# Patient Record
Sex: FEMALE | Race: WHITE | NOT HISPANIC OR LATINO | Employment: OTHER | URBAN - METROPOLITAN AREA
[De-identification: names, ages, dates, MRNs, and addresses within clinical notes are randomized per-mention and may not be internally consistent; named-entity substitution may affect disease eponyms.]

---

## 2017-01-04 ENCOUNTER — OFFICE VISIT (OUTPATIENT)
Dept: MEDICAL GROUP | Facility: MEDICAL CENTER | Age: 77
End: 2017-01-04
Payer: MEDICARE

## 2017-01-04 VITALS
SYSTOLIC BLOOD PRESSURE: 150 MMHG | DIASTOLIC BLOOD PRESSURE: 86 MMHG | WEIGHT: 164.2 LBS | HEIGHT: 66 IN | TEMPERATURE: 98.1 F | OXYGEN SATURATION: 95 % | HEART RATE: 63 BPM | BODY MASS INDEX: 26.39 KG/M2 | RESPIRATION RATE: 14 BRPM

## 2017-01-04 DIAGNOSIS — F43.23 SITUATIONAL MIXED ANXIETY AND DEPRESSIVE DISORDER: ICD-10-CM

## 2017-01-04 DIAGNOSIS — I10 ESSENTIAL HYPERTENSION: ICD-10-CM

## 2017-01-04 DIAGNOSIS — F51.01 PRIMARY INSOMNIA: ICD-10-CM

## 2017-01-04 DIAGNOSIS — R53.83 CAREGIVER WITH FATIGUE: ICD-10-CM

## 2017-01-04 PROCEDURE — 99214 OFFICE O/P EST MOD 30 MIN: CPT | Performed by: INTERNAL MEDICINE

## 2017-01-04 RX ORDER — SERTRALINE HYDROCHLORIDE 100 MG/1
100 TABLET, FILM COATED ORAL DAILY
Qty: 90 TAB | Refills: 1 | Status: SHIPPED | OUTPATIENT
Start: 2017-01-04 | End: 2017-03-06

## 2017-01-04 NOTE — PROGRESS NOTES
Janell is a 76 y.o. female who is here today because    Chief Complaint   Patient presents with   • Follow-Up     requesting an increase in dose for zoloft       The patient's current problem list and medication list is:    Patient Active Problem List    Diagnosis Date Noted   • Caregiver with fatigue 09/07/2016   • Other chest pain 03/10/2016   • Situational mixed anxiety and depressive disorder 12/04/2015   • MEDICAL HOME 05/22/2015   • Seasonal asthma 04/27/2015   • Osteoporosis 01/13/2015   • Prediabetes 10/13/2014   • Hyperlipemia 07/25/2014   • Insomnia 07/25/2014   • Back pain 07/25/2014   • Arthritis 07/25/2014   • Hypothyroid 01/31/2013   • Essential hypertension 01/31/2013   • History of colonic diverticulitis 01/30/2013   • Pancreatic cyst 10/22/2012       Current Outpatient Prescriptions   Medication Sig Dispense Refill   • sertraline (ZOLOFT) 100 MG Tab Take 1 Tab by mouth every day. 90 Tab 1   • atorvastatin (LIPITOR) 10 MG Tab Take 1 Tab by mouth every day. 90 Tab 0   • benazepril (LOTENSIN) 40 MG tablet Take 1 Tab by mouth every day. 90 Tab 0   • alprazolam (XANAX) 0.5 MG Tab Take 1 Tab by mouth at bedtime as needed for Sleep. 90 Tab 1   • zolpidem (AMBIEN) 10 MG Tab Take 1 Tab by mouth at bedtime as needed for Sleep. 90 Tab 3   • calcium carbonate (CALCIUM CARBONATE) 500 MG Tab Take 500 mg by mouth 2 times a day, with meals.     • magnesium chloride SR (SLO-MAG) 535 (64 MG) MG Tab CR Take 535 mg by mouth every day.     • furosemide (LASIX) 20 MG Tab TAKE 1 TAB BY MOUTH EVERY DAY. 90 Tab 2   • SYNTHROID 88 MCG Tab TAKE 1 TAB BY MOUTH EVERY DAY. BRAND NAME ONLY INDICATIONS: UNDERACTIVE THYROID 90 Tab 3   • IPRATROPIUM BROMIDE NA Spray  in nose.     • polyethylene glycol/lytes (MIRALAX) PACK Take 17 g by mouth every day.     • therapeutic multivitamin-minerals (THERAGRAN-M) TABS Take 1 Tab by mouth every day.     • Cholecalciferol (VITAMIN D) 2000 UNITS CAPS Take 1 Cap by mouth every day.     •  "conjugated estrogen (PREMARIN) 0.625 MG/GM CREA Insert 0.5 g in vagina every day.     • ipratropium (ATROVENT) 0.02 % SOLN 0.2 mg by Nebulization route 4 times a day.     • Probiotic Product (PROBIOTIC FORMULA PO) Take  by mouth every day.       • cetirizine (ZYRTEC) 10 MG TABS Take 10 mg by mouth every day.     • Azelastine HCl (OPTIVAR OP) by Ophthalmic route every day.     • cefdinir (OMNICEF) 300 MG Cap Take 1 Cap by mouth 2 times a day. 20 Cap 0   • azithromycin (ZITHROMAX) 250 MG Tab Take two tabs on day one followed by one tab on days 2-5. 6 Tab 0     No current facility-administered medications for this visit.     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 Diagnoses of Caregiver with fatigue, Situational mixed anxiety and depressive disorder, Primary insomnia, and Essential hypertension were pertinent to this visit.    1. Caregiver with fatigue  Spending almost 100% of her time caring for Edge.     2. Situational mixed anxiety and depressive disorder  Has noted little change since starting sertraline.     3. Primary insomnia  Sleep has not deteriorated; she is continuing the same medications.    4. Essential hypertension  Recent BP elevated noted at home. No dietary changes. Benazepril is her long time medication. No headache complaint. No dizziness.       ROS Denies chest pain, shortness of breath, changes in bowel and bladder function, lower extremity edema.    Allergies as of 01/04/2017 - Rex as Reviewed 01/04/2017   Allergen Reaction Noted   • Pcn [penicillins] Hives 12/12/2008      /86 mmHg  Pulse 63  Temp(Src) 36.7 °C (98.1 °F)  Resp 14  Ht 1.664 m (5' 5.51\")  Wt 74.481 kg (164 lb 3.2 oz)  BMI 26.90 kg/m2  SpO2 95%    PHYSICAL EXAMINATION  Gen:         Alert and oriented, No apparent distress. Anxious appearance.   Neck:       No Jugular venous distension, Lymphadenopathy, Thyromegalyits.  Lungs:     Clear to auscultation bilaterally  CV:          Regular rate and " rhythm. No murmurs, rubs or gallops.  Abd:         Soft, non distended. No tenderness. Normal active bowel sounds. No   Hepatosplenomegaly, No pulsatile masses.                   Ext:          No clubbing, cyanosis, edema.  Spine:      ROM    ASSESSMENT AND PLAN     76 y.o. female     1. Caregiver with fatigue  Encouraged her and Edge to let friends visit and allow her to leave the house    2. Situational mixed anxiety and depressive disorder  Increase sertraline from 50 to 100  - sertraline (ZOLOFT) 100 MG Tab; Take 1 Tab by mouth every day.  Dispense: 90 Tab; Refill: 1    3. Primary insomnia  Continue with current medication    4. Essential hypertension  Systolic elevation today. Probably anxiety. Will continue to measure at home and let us know if systolic remains above 150. Return to office for BP check next week, and bring her measuring device for accuracy check.      Followup: 1 month

## 2017-01-04 NOTE — MR AVS SNAPSHOT
"        Janell Crow Alvina   2017 11:40 AM   Office Visit   MRN: [id number]    Department:  [address] [place] [address]   Dept Phone:  [phone]    Description:  Female : 1940   Provider:  Roger Gracia M.D.           Reason for Visit     Follow-Up requesting an increase in dose for zoloft      Allergies as of 2017     Allergen Noted Reactions    Pcn [Penicillins] 2008   Hives      You were diagnosed with     Caregiver with fatigue   [[id number]]       Situational mixed anxiety and depressive disorder   [832354]         Vital Signs     Blood Pressure Pulse Temperature Respirations Height Weight    150/86 mmHg 63 36.7 °C (98.1 °F) 14 1.664 m (5' 5.51\") 74.481 kg (164 lb 3.2 oz)    Body Mass Index Oxygen Saturation Smoking Status             26.90 kg/m2 95% Never Smoker          Basic Information     Date Of Birth Sex Race Ethnicity Preferred Language    1940 Female White Non- English      Problem List              ICD-10-CM Priority Class Noted - Resolved    Pancreatic cyst K86.2   10/22/2012 - Present    History of colonic diverticulitis Z87.19   2013 - Present    Hypothyroid E03.9   2013 - Present    Essential hypertension I10   2013 - Present    Hyperlipemia E78.5   2014 - Present    Insomnia G47.00   2014 - Present    Back pain M54.9   2014 - Present    Arthritis M19.90   2014 - Present    Prediabetes R73.03   10/13/2014 - Present    Osteoporosis M81.0   2015 - Present    Seasonal asthma J45.998   2015 - Present    MEDICAL HOME    2015 - Present    Situational mixed anxiety and depressive disorder F43.23   2015 - Present    Other chest pain R07.89   3/10/2016 - Present    Caregiver with fatigue R53.83   2016 - Present      Health Maintenance        Date Due Completion Dates    IMM DTaP/Tdap/Td Vaccine (1 - Tdap) 1959 ---    COLONOSCOPY 2020 (Done)    Override on 2010: Done (DR [facility]an-every 5 years)   " BONE DENSITY 5/8/2020 5/8/2015, 4/27/2011 (Done)    Override on 4/27/2011: Done (Life screening test)            Current Immunizations     13-VALENT PCV PREVNAR 10/31/2015    Influenza Vaccine Adult HD 10/11/2016, 10/31/2015    Pneumococcal polysaccharide vaccine (PPSV-23) 11/3/2006    SHINGLES VACCINE 10/30/2013      Below and/or attached are the medications your provider expects you to take. Review all of your home medications and newly ordered medications with your provider and/or pharmacist. Follow medication instructions as directed by your provider and/or pharmacist. Please keep your medication list with you and share with your provider. Update the information when medications are discontinued, doses are changed, or new medications (including over-the-counter products) are added; and carry medication information at all times in the event of emergency situations     Allergies:  PCN - Hives               Medications  Valid as of: January 04, 2017 - 12:42 PM    Generic Name Brand Name Tablet Size Instructions for use    ALPRAZolam (Tab) XANAX 0.5 MG Take 1 Tab by mouth at bedtime as needed for Sleep.        Atorvastatin Calcium (Tab) LIPITOR 10 MG Take 1 Tab by mouth every day.        Azelastine HCl   by Ophthalmic route every day.        Azithromycin (Tab) ZITHROMAX 250 MG Take two tabs on day one followed by one tab on days 2-5.        Benazepril HCl (Tab) LOTENSIN 40 MG Take 1 Tab by mouth every day.        Cefdinir (Cap) OMNICEF 300 MG Take 1 Cap by mouth 2 times a day.        Cetirizine HCl (Tab) ZYRTEC 10 MG Take 10 mg by mouth every day.        Cholecalciferol (Cap) Vitamin D 2000 UNITS Take 1 Cap by mouth every day.        Estrogens, Conjugated (Cream) PREMARIN 0.625 MG/GM Insert 0.5 g in vagina every day.        Furosemide (Tab) LASIX 20 MG TAKE 1 TAB BY MOUTH EVERY DAY.        Ipratropium Bromide (Solution) ATROVENT 0.02 % 0.2 mg by Nebulization route 4 times a day.        Ipratropium Bromide   Spray   in nose.        Levothyroxine Sodium (Tab) SYNTHROID 88 MCG TAKE 1 TAB BY MOUTH EVERY DAY. BRAND NAME ONLY INDICATIONS: UNDERACTIVE THYROID        Magnesium Chloride (Tab CR) SLO- (64 MG) MG Take 535 mg by mouth every day.        Multiple Vitamins-Minerals (Tab) THERAGRAN-M  Take 1 Tab by mouth every day.        Oyster Shell (Tab) OS-CUBA 500 500 MG Take 500 mg by mouth 2 times a day, with meals.        Polyethylene Glycol 3350 (Pack) MIRALAX  Take 17 g by mouth every day.        Probiotic Product   Take  by mouth every day.          Sertraline HCl (Tab) ZOLOFT 100 MG Take 1 Tab by mouth every day.        Zolpidem Tartrate (Tab) AMBIEN 10 MG Take 1 Tab by mouth at bedtime as needed for Sleep.        .                 Medicines prescribed today were sent to:     [facility]/PHARMACY #[id number] - [place], [place] - [address]    [address] [place] [address]    Phone: [phone] Fax: [phone]    Open 24 Hours?: No      Medication refill instructions:       If your prescription bottle indicates you have medication refills left, it is not necessary to call your provider’s office. Please contact your pharmacy and they will refill your medication.    If your prescription bottle indicates you do not have any refills left, you may request refills at any time through one of the following ways: The online [employer] system (except Urgent Care), by calling your provider’s office, or by asking your pharmacy to contact your provider’s office with a refill request. Medication refills are processed only during regular business hours and may not be available until the next business day. Your provider may request additional information or to have a follow-up visit with you prior to refilling your medication.   *Please Note: Medication refills are assigned a new Rx number when refilled electronically. Your pharmacy may indicate that no refills were authorized even though a new prescription for the same medication is available at  the pharmacy. Please request the medicine by name with the pharmacy before contacting your provider for a refill.        Other Notes About Your Plan     Patient is enrolled in [facility] with Dr. Gracia.           [facility] Access Code: Activation code not generated  Current [employer] Status: Active

## 2017-01-06 ENCOUNTER — NON-PROVIDER VISIT (OUTPATIENT)
Dept: MEDICAL GROUP | Facility: MEDICAL CENTER | Age: 77
End: 2017-01-06
Payer: MEDICARE

## 2017-01-06 VITALS — SYSTOLIC BLOOD PRESSURE: 149 MMHG | DIASTOLIC BLOOD PRESSURE: 59 MMHG

## 2017-01-06 DIAGNOSIS — I10 ESSENTIAL HYPERTENSION: ICD-10-CM

## 2017-01-06 NOTE — MR AVS SNAPSHOT
Janell Crow Alvina   2017 1:30 PM   Non-Provider Visit   MRN: [id number]    Department:  [address] [place] [address]   Dept Phone:  [phone]    Description:  Female : 1940   Provider:  MEDICAL ASSISTANT           Allergies as of 2017     Allergen Noted Reactions    Pcn [Penicillins] 2008   Hives      Vital Signs     Blood Pressure Smoking Status                149/59 mmHg Never Smoker           Basic Information     Date Of Birth Sex Race Ethnicity Preferred Language    1940 Female White Non- English      Problem List              ICD-10-CM Priority Class Noted - Resolved    Pancreatic cyst K86.2   10/22/2012 - Present    History of colonic diverticulitis Z87.19   2013 - Present    Hypothyroid E03.9   2013 - Present    Essential hypertension I10   2013 - Present    Hyperlipemia E78.5   2014 - Present    Insomnia G47.00   2014 - Present    Back pain M54.9   2014 - Present    Arthritis M19.90   2014 - Present    Prediabetes R73.03   10/13/2014 - Present    Osteoporosis M81.0   2015 - Present    Seasonal asthma J45.998   2015 - Present    MEDICAL HOME    2015 - Present    Situational mixed anxiety and depressive disorder F43.23   2015 - Present    Other chest pain R07.89   3/10/2016 - Present    Caregiver with fatigue R53.83   2016 - Present      Health Maintenance        Date Due Completion Dates    IMM DTaP/Tdap/Td Vaccine (1 - Tdap) 1959 ---    COLONOSCOPY 2020 (Done)    Override on 2010: Done (DR Duran-every 5 years)    BONE DENSITY 2020, 2011 (Done)    Override on 2011: Done (Life screening test)            Current Immunizations     13-VALENT PCV PREVNAR 10/31/2015    Influenza Vaccine Adult HD 10/11/2016, 10/31/2015    Pneumococcal polysaccharide vaccine (PPSV-23) 11/3/2006    SHINGLES VACCINE 10/30/2013      Below and/or attached are the medications your provider  expects you to take. Review all of your home medications and newly ordered medications with your provider and/or pharmacist. Follow medication instructions as directed by your provider and/or pharmacist. Please keep your medication list with you and share with your provider. Update the information when medications are discontinued, doses are changed, or new medications (including over-the-counter products) are added; and carry medication information at all times in the event of emergency situations     Allergies:  PCN - Hives               Medications  Valid as of: January 06, 2017 -  2:25 PM    Generic Name Brand Name Tablet Size Instructions for use    ALPRAZolam (Tab) XANAX 0.5 MG Take 1 Tab by mouth at bedtime as needed for Sleep.        Atorvastatin Calcium (Tab) LIPITOR 10 MG Take 1 Tab by mouth every day.        Azelastine HCl   by Ophthalmic route every day.        Azithromycin (Tab) ZITHROMAX 250 MG Take two tabs on day one followed by one tab on days 2-5.        Benazepril HCl (Tab) LOTENSIN 40 MG Take 1 Tab by mouth every day.        Cetirizine HCl (Tab) ZYRTEC 10 MG Take 10 mg by mouth every day.        Cholecalciferol (Cap) Vitamin D 2000 UNITS Take 1 Cap by mouth every day.        Estrogens, Conjugated (Cream) PREMARIN 0.625 MG/GM Insert 0.5 g in vagina every day.        Furosemide (Tab) LASIX 20 MG TAKE 1 TAB BY MOUTH EVERY DAY.        Ipratropium Bromide (Solution) ATROVENT 0.02 % 0.2 mg by Nebulization route 4 times a day.        Ipratropium Bromide   Spray  in nose.        Levothyroxine Sodium (Tab) SYNTHROID 88 MCG TAKE 1 TAB BY MOUTH EVERY DAY. BRAND NAME ONLY INDICATIONS: UNDERACTIVE THYROID        Magnesium Chloride (Tab CR) SLO- (64 MG) MG Take 535 mg by mouth every day.        Multiple Vitamins-Minerals (Tab) THERAGRAN-M  Take 1 Tab by mouth every day.        Oyster Shell (Tab) OS-CUBA 500 500 MG Take 500 mg by mouth 2 times a day, with meals.        Polyethylene Glycol 3350 (Pack)  MIRALAX  Take 17 g by mouth every day.        Probiotic Product   Take  by mouth every day.          Sertraline HCl (Tab) ZOLOFT 100 MG Take 1 Tab by mouth every day.        Zolpidem Tartrate (Tab) AMBIEN 10 MG Take 1 Tab by mouth at bedtime as needed for Sleep.        .                 Medicines prescribed today were sent to:     [facility]/PHARMACY #[id number] - [place], [place] - [address]    [address] [place] [address]    Phone: [phone] Fax: [phone]    Open 24 Hours?: No      Medication refill instructions:       If your prescription bottle indicates you have medication refills left, it is not necessary to call your provider’s office. Please contact your pharmacy and they will refill your medication.    If your prescription bottle indicates you do not have any refills left, you may request refills at any time through one of the following ways: The online [employer] system (except Urgent Care), by calling your provider’s office, or by asking your pharmacy to contact your provider’s office with a refill request. Medication refills are processed only during regular business hours and may not be available until the next business day. Your provider may request additional information or to have a follow-up visit with you prior to refilling your medication.   *Please Note: Medication refills are assigned a new Rx number when refilled electronically. Your pharmacy may indicate that no refills were authorized even though a new prescription for the same medication is available at the pharmacy. Please request the medicine by name with the pharmacy before contacting your provider for a refill.        Other Notes About Your Plan     Patient is enrolled in [facility] with Dr. Gracia.           [employer] Access Code: Activation code not generated  Current [employer] Status: Active

## 2017-01-06 NOTE — PROGRESS NOTES
Janell Tinsley is a 76 y.o. female here for a non-provider visit for BP Check    Filed Vitals:    01/06/17 1332 01/06/17 1334 01/06/17 1347 01/06/17 1348   BP: 164/62 172/74 142/56 149/59     If abnormal, was an in office provider notified today? Yes  Routed to PCP? Yes

## 2017-01-08 NOTE — PATIENT INSTRUCTIONS
Please take all medications as directed.  Call and report any side effects of medications.    Have laboratory tests drawn or other studies performed as ordered.     Please keep all appointments for follow up and referrals.

## 2017-01-10 ENCOUNTER — TELEPHONE (OUTPATIENT)
Dept: MEDICAL GROUP | Facility: MEDICAL CENTER | Age: 77
End: 2017-01-10

## 2017-01-12 DIAGNOSIS — I10 ESSENTIAL HYPERTENSION: ICD-10-CM

## 2017-01-12 RX ORDER — METOPROLOL SUCCINATE 25 MG/1
25 TABLET, EXTENDED RELEASE ORAL DAILY
Qty: 30 TAB | Refills: 2 | Status: SHIPPED | OUTPATIENT
Start: 2017-01-12 | End: 2017-03-06

## 2017-01-25 DIAGNOSIS — E03.4 HYPOTHYROIDISM DUE TO ACQUIRED ATROPHY OF THYROID: ICD-10-CM

## 2017-01-25 RX ORDER — LEVOTHYROXINE SODIUM 88 MCG
TABLET ORAL
Qty: 90 TAB | Refills: 3 | Status: SHIPPED | OUTPATIENT
Start: 2017-01-25 | End: 2017-06-08

## 2017-03-03 DIAGNOSIS — I10 ESSENTIAL HYPERTENSION: ICD-10-CM

## 2017-03-03 RX ORDER — BENAZEPRIL HYDROCHLORIDE 40 MG/1
40 TABLET ORAL DAILY
Qty: 90 TAB | Refills: 0 | Status: CANCELLED | OUTPATIENT
Start: 2017-03-03

## 2017-03-03 RX ORDER — BENAZEPRIL HYDROCHLORIDE 40 MG/1
40 TABLET ORAL DAILY
Qty: 90 TAB | Refills: 0 | Status: SHIPPED | OUTPATIENT
Start: 2017-03-03 | End: 2017-06-09 | Stop reason: SDUPTHER

## 2017-03-06 ENCOUNTER — OFFICE VISIT (OUTPATIENT)
Dept: MEDICAL GROUP | Facility: MEDICAL CENTER | Age: 77
End: 2017-03-06
Payer: MEDICARE

## 2017-03-06 VITALS
HEIGHT: 66 IN | RESPIRATION RATE: 16 BRPM | HEART RATE: 76 BPM | WEIGHT: 158.8 LBS | BODY MASS INDEX: 25.52 KG/M2 | TEMPERATURE: 97.5 F | DIASTOLIC BLOOD PRESSURE: 58 MMHG | OXYGEN SATURATION: 96 % | SYSTOLIC BLOOD PRESSURE: 120 MMHG

## 2017-03-06 DIAGNOSIS — R53.83 CAREGIVER WITH FATIGUE: ICD-10-CM

## 2017-03-06 DIAGNOSIS — M81.0 OSTEOPOROSIS: ICD-10-CM

## 2017-03-06 DIAGNOSIS — I10 ESSENTIAL HYPERTENSION: ICD-10-CM

## 2017-03-06 DIAGNOSIS — F51.01 PRIMARY INSOMNIA: ICD-10-CM

## 2017-03-06 DIAGNOSIS — E78.00 PURE HYPERCHOLESTEROLEMIA: ICD-10-CM

## 2017-03-06 DIAGNOSIS — F43.23 SITUATIONAL MIXED ANXIETY AND DEPRESSIVE DISORDER: ICD-10-CM

## 2017-03-06 DIAGNOSIS — E03.9 HYPOTHYROIDISM, UNSPECIFIED TYPE: ICD-10-CM

## 2017-03-06 PROCEDURE — 99214 OFFICE O/P EST MOD 30 MIN: CPT | Performed by: FAMILY MEDICINE

## 2017-03-06 PROCEDURE — 1036F TOBACCO NON-USER: CPT | Performed by: FAMILY MEDICINE

## 2017-03-06 PROCEDURE — G8432 DEP SCR NOT DOC, RNG: HCPCS | Performed by: FAMILY MEDICINE

## 2017-03-06 PROCEDURE — G8482 FLU IMMUNIZE ORDER/ADMIN: HCPCS | Performed by: FAMILY MEDICINE

## 2017-03-06 PROCEDURE — 4040F PNEUMOC VAC/ADMIN/RCVD: CPT | Performed by: FAMILY MEDICINE

## 2017-03-06 PROCEDURE — 1101F PT FALLS ASSESS-DOCD LE1/YR: CPT | Performed by: FAMILY MEDICINE

## 2017-03-06 PROCEDURE — G8420 CALC BMI NORM PARAMETERS: HCPCS | Performed by: FAMILY MEDICINE

## 2017-03-06 RX ORDER — SERTRALINE HYDROCHLORIDE 100 MG/1
200 TABLET, FILM COATED ORAL DAILY
Qty: 90 TAB | Refills: 1
Start: 2017-03-06 | End: 2017-07-11 | Stop reason: SDUPTHER

## 2017-03-06 NOTE — MR AVS SNAPSHOT
"        Janell Tinsley   3/6/2017 3:40 PM   Office Visit   MRN: [id number]    Department:  [address] [place] [address]   Dept Phone:  [phone]    Description:  Female : 1940   Provider:  Amairani Bello M.D.           Reason for Visit     Establish Care           Allergies as of 3/6/2017     No Known Allergies      You were diagnosed with     Primary insomnia   [339348]       Situational mixed anxiety and depressive disorder   [212262]       Caregiver with fatigue   [514770]         Vital Signs     Blood Pressure Pulse Temperature Respirations Height Weight    120/58 mmHg 76 36.4 °C (97.5 °F) 16 1.664 m (5' 5.51\") 72.031 kg (158 lb 12.8 oz)    Body Mass Index Oxygen Saturation Smoking Status             26.01 kg/m2 96% Never Smoker          Basic Information     Date Of Birth Sex Race Ethnicity Preferred Language    1940 Female White Non- English      Your appointments     2017  2:20 PM   Established Patient with Amairani Bello M.D.   [facility] [address]    [address] [facility] [address]  [place] [address] [phone]           You will be receiving a confirmation call a few days before your appointment from our automated call confirmation system.            2017  2:20 PM   Established Patient with Amairani Bello M.D.   [facility] [address]    [address]  [address]  [place] [address] [phone]           You will be receiving a confirmation call a few days before your appointment from our automated call confirmation system.              Problem List              ICD-10-CM Priority Class Noted - Resolved    Pancreatic cyst K86.2   10/22/2012 - Present    History of colonic diverticulitis Z87.19   2013 - Present    Hypothyroid E03.9   2013 - Present    Essential hypertension I10   2013 - Present    Hyperlipemia E78.5   2014 - Present    Insomnia G47.00   2014 - Present    Back pain " M54.9   7/25/2014 - Present    Arthritis M19.90   7/25/2014 - Present    Prediabetes R73.03   10/13/2014 - Present    Osteoporosis M81.0   1/13/2015 - Present    Seasonal asthma J45.998   4/27/2015 - Present    MEDICAL HOME    5/22/2015 - Present    Situational mixed anxiety and depressive disorder F43.23   12/4/2015 - Present    Other chest pain R07.89   3/10/2016 - Present    Caregiver with fatigue R53.83   9/7/2016 - Present      Health Maintenance        Date Due Completion Dates    IMM DTaP/Tdap/Td Vaccine (1 - Tdap) 2/19/1959 ---    COLONOSCOPY 1/13/2020 1/13/2010 (Done)    Override on 1/13/2010: Done (DR Duran-every 5 years)    BONE DENSITY 5/8/2020 5/8/2015, 4/27/2011 (Done)    Override on 4/27/2011: Done (Life screening test)            Current Immunizations     13-VALENT PCV PREVNAR 10/31/2015    Influenza Vaccine Adult HD 10/11/2016, 10/31/2015    Pneumococcal polysaccharide vaccine (PPSV-23) 11/3/2006    SHINGLES VACCINE 10/30/2013      Below and/or attached are the medications your provider expects you to take. Review all of your home medications and newly ordered medications with your provider and/or pharmacist. Follow medication instructions as directed by your provider and/or pharmacist. Please keep your medication list with you and share with your provider. Update the information when medications are discontinued, doses are changed, or new medications (including over-the-counter products) are added; and carry medication information at all times in the event of emergency situations     Allergies:  No Known Allergies          Medications  Valid as of: March 06, 2017 -  4:12 PM    Generic Name Brand Name Tablet Size Instructions for use    ALPRAZolam (Tab) XANAX 0.5 MG Take 1 Tab by mouth at bedtime as needed for Sleep.        Atorvastatin Calcium (Tab) LIPITOR 10 MG Take 1 Tab by mouth every day.        Azelastine HCl   by Ophthalmic route every day.        Benazepril HCl (Tab) LOTENSIN 40 MG Take 1  Tab by mouth every day. Indications: High Blood Pressure        Cetirizine HCl (Tab) ZYRTEC 10 MG Take 10 mg by mouth every day.        Cholecalciferol (Cap) Vitamin D 2000 UNITS Take 1 Cap by mouth every day.        Estrogens, Conjugated (Cream) PREMARIN 0.625 MG/GM Insert 0.5 g in vagina every day.        Furosemide (Tab) LASIX 20 MG TAKE 1 TAB BY MOUTH EVERY DAY.        Ipratropium Bromide (Solution) ATROVENT 0.02 % 0.2 mg by Nebulization route 4 times a day.        Levothyroxine Sodium (Tab) SYNTHROID 88 MCG TAKE 1 TAB BY MOUTH EVERY DAY. BRAND NAME ONLY INDICATIONS: UNDERACTIVE THYROID        Magnesium Chloride (Tab CR) SLO- (64 MG) MG Take 535 mg by mouth every day.        Multiple Vitamins-Minerals (Tab) THERAGRAN-M  Take 1 Tab by mouth every day.        Oyster Shell (Tab) OS-CUBA 500 500 MG Take 500 mg by mouth 2 times a day, with meals.        Polyethylene Glycol 3350 (Pack) MIRALAX  Take 17 g by mouth every day.        Probiotic Product   Take  by mouth every day.          Sertraline HCl (Tab) ZOLOFT 100 MG Take 2 Tabs by mouth every day.        Zolpidem Tartrate (Tab) AMBIEN 10 MG Take 1 Tab by mouth at bedtime as needed for Sleep.        .                 Medicines prescribed today were sent to:     [facility]/PHARMACY #[id number] - [place], [place] - [address]    [address] [place] [address]    Phone: [phone] Fax: [phone]    Open 24 Hours?: No      Medication refill instructions:       If your prescription bottle indicates you have medication refills left, it is not necessary to call your provider’s office. Please contact your pharmacy and they will refill your medication.    If your prescription bottle indicates you do not have any refills left, you may request refills at any time through one of the following ways: The online [employer] system (except Urgent Care), by calling your provider’s office, or by asking your pharmacy to contact your provider’s office with a refill request.  Medication refills are processed only during regular business hours and may not be available until the next business day. Your provider may request additional information or to have a follow-up visit with you prior to refilling your medication.   *Please Note: Medication refills are assigned a new Rx number when refilled electronically. Your pharmacy may indicate that no refills were authorized even though a new prescription for the same medication is available at the pharmacy. Please request the medicine by name with the pharmacy before contacting your provider for a refill.        Referral     A referral request has been sent to our patient care coordination department. Please allow 3-5 business days for us to process this request and contact you either by phone or mail. If you do not hear from us by the 5th business day, please call us at [phone].        Other Notes About Your Plan     Patient is enrolled in [facility] with Dr. Gracia.           [employer] Access Code: Activation code not generated  Current [employer] Status: Active

## 2017-03-07 NOTE — PROGRESS NOTES
"cc:  insomnia    Subjective:     Janell Tinsley is a 77 y.o. female presenting to [facility] care and to discuss the followin. Insomnia:  Reports long standing insomnia for years.  Has difficulty falling asleep and staying asleep. Has tried \"everything\" in the past, has tried stopping medications but with no help.  Has been taking ambien 10mg for years.  For the past couple of months, the ambien was less effective, so her previous provider started her on xanax 0.5mg to take an hour prior to ambien.  This combination has been working for her.  Denies any side effects.    2. 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 She worries constantly about him, worries about their future.  She wants to get some assistance at home but her  makes it quite difficult to accept any change.  She doesn't want to leave him at home to run errands as he is unpredictable, yet he won't allow friends or others to come over when she goes out.  Her life now revolves around medical visits, pharmacy visits, and basic errands.  She doesn't feel depressed, but does feel quite stressed. She was started on sertraline to see if this would help her anxiety, she hasn't noticed much a difference, has been taking 100mg daily for the past 3-4 months. Is wondering if she can increase it.    3. HTN: has hx of arterial hypertension, stable on lasix and benzepril. Denies any chest pain, shortness of breath, leg swelling, jaw claudication, lightheadedness, dizziness.    4. HLD: is on atorvastatin 10mg daily    5.  Hypothyroidism: has hypothyroidism, is on synthroid 88 mcg daily.  Denies any heat/cold intolerance, diarrhea/constipation, abdominal pain, tremors, skin changes.    6. Osteoporosis: last dexa in  with osteoporosis.  She has been on prolia and boniva over the years. Currently on vitamin D and calcium, is not interested in doing another " medication for osteoporosis.      Review of systems:  See above.          Current outpatient prescriptions:   •  sertraline (ZOLOFT) 100 MG Tab, Take 2 Tabs by mouth every day., Disp: 90 Tab, Rfl: 1  •  benazepril (LOTENSIN) 40 MG tablet, Take 1 Tab by mouth every day. Indications: High Blood Pressure, Disp: 90 Tab, Rfl: 0  •  SYNTHROID 88 MCG Tab, TAKE 1 TAB BY MOUTH EVERY DAY. BRAND NAME ONLY INDICATIONS: UNDERACTIVE THYROID, Disp: 90 Tab, Rfl: 3  •  atorvastatin (LIPITOR) 10 MG Tab, Take 1 Tab by mouth every day., Disp: 90 Tab, Rfl: 0  •  alprazolam (XANAX) 0.5 MG Tab, Take 1 Tab by mouth at bedtime as needed for Sleep., Disp: 90 Tab, Rfl: 1  •  zolpidem (AMBIEN) 10 MG Tab, Take 1 Tab by mouth at bedtime as needed for Sleep., Disp: 90 Tab, Rfl: 3  •  calcium carbonate (CALCIUM CARBONATE) 500 MG Tab, Take 500 mg by mouth 2 times a day, with meals., Disp: , Rfl:   •  magnesium chloride SR (SLO-MAG) 535 (64 MG) MG Tab CR, Take 535 mg by mouth every day., Disp: , Rfl:   •  furosemide (LASIX) 20 MG Tab, TAKE 1 TAB BY MOUTH EVERY DAY., Disp: 90 Tab, Rfl: 2  •  polyethylene glycol/lytes (MIRALAX) PACK, Take 17 g by mouth every day., Disp: , Rfl:   •  therapeutic multivitamin-minerals (THERAGRAN-M) TABS, Take 1 Tab by mouth every day., Disp: , Rfl:   •  Cholecalciferol (VITAMIN D) 2000 UNITS CAPS, Take 1 Cap by mouth every day., Disp: , Rfl:   •  conjugated estrogen (PREMARIN) 0.625 MG/GM CREA, Insert 0.5 g in vagina every day., Disp: , Rfl:   •  ipratropium (ATROVENT) 0.02 % SOLN, 0.2 mg by Nebulization route 4 times a day., Disp: , Rfl:   •  Probiotic Product (PROBIOTIC FORMULA PO), Take  by mouth every day.  , Disp: , Rfl:   •  cetirizine (ZYRTEC) 10 MG TABS, Take 10 mg by mouth every day., Disp: , Rfl:   •  Azelastine HCl (OPTIVAR OP), by Ophthalmic route every day., Disp: , Rfl:     No Known Allergies    Past Medical History   Diagnosis Date   • Thyroid activity decreased      medicated   • CAD (coronary artery  "disease)      medicated   • Hypertension      medicated   • ASTHMA      \"seasonal\"   • Unspecified urinary incontinence    • Diverticulitis 9/2012   • Infectious disease      MRSA   • Hyperlipemia 7/25/2014   • Insomnia 7/25/2014   • Pain of left heel 1/13/2015   • MEDICAL HOME    • Back pain 7/25/2014     S/p epidural L4 radiculopathy Completed PT    • Arthritis 7/25/2014     S/P bilateral meniscectomies R knee pain > L    • History of colonic diverticulitis 1/30/2013     2 episodes in past year 2013-14      Past Surgical History   Procedure Laterality Date   • Other abdominal surgery       appy   • Gyn surgery       hysterectomy   • Other orthopedic surgery       right shoulder rotater cuff   • Other       tonsillectomy   • Knee arthroscopy  8/5/2010     Performed by RINA MONTESINOS at SURGERY SAME DAY [facility] ORS   • Medial meniscectomy  8/5/2010     Performed by RINA MONTESINOS at SURGERY SAME DAY [facility] ORS   • Meniscectomy  8/5/2010     Performed by RINA MONTESINOS at SURGERY SAME DAY [facility] ORS   • Other  1970     laparotomy removed fibroid   • Gastroscopy with biopsy  10/22/2012     Performed by Santosh Medina M.D. at SURGERY [facility]   • Egd with asp/bx  10/22/2012     Performed by Santosh Medina M.D. at SURGERY [facility]   • Abdominal hysterectomy total       Family History   Problem Relation Age of Onset   • Diabetes     • Hypertension     • Stroke     • Cancer     • Arthritis Mother    • Cancer Mother      cervical   • Heart Disease Father      Social History     Social History   • Marital Status:      Spouse Name: N/A   • Number of Children: N/A   • Years of Education: N/A     Occupational History   • Not on file.     Social History Main Topics   • Smoking status: Never Smoker    • Smokeless tobacco: Never Used   • Alcohol Use: No   • Drug Use: No   • Sexual Activity:     Partners: Male     Other Topics Concern   • Not on file     Social History Narrative    " "Former  of a company in [place].   with lewy body dementia, she is the primary caregiver       Objective:     Vitals: /58 mmHg  Pulse 76  Temp(Src) 36.4 °C (97.5 °F)  Resp 16  Ht 1.664 m (5' 5.51\")  Wt 72.031 kg (158 lb 12.8 oz)  BMI 26.01 kg/m2  SpO2 96%  General: Alert, pleasant, NAD  HEENT: Normocephalic.    Psych:  Affect/mood is normal, judgement is good, memory is intact, grooming is appropriate.    Phq9: 8, not difficult at all  Gad7: 5, somewhat difficult    Assessment/Plan:     Janell was seen today for establish care.    Diagnoses and all orders for this visit:    Primary insomnia  Discussed risks of combining xanax and ambien for sleep.  Will refer to sleep medicine to see if there alternatives. She was also interested in trying CBT for sleep, referral placed.  -     REFERRAL TO PULMONOLOGY  -     REFERRAL TO PSYCHOLOGY    Caregiver with fatigue  Discussed options for care for her , gave support  -     REFERRAL TO PSYCHOLOGY    Situational mixed anxiety and depressive disorder  Will try increasing zoloft to 200mg daily, f/u in 3 months  -     sertraline (ZOLOFT) 100 MG Tab; Take 2 Tabs by mouth every day.    Essential hypertension  Stable, continue current meds. F/u in 3 months, will order labs then    Pure hypercholesterolemia  Stable, continue current med. F/u in 3 months, will order labs then    Hypothyroidism, unspecified type  Stable, continue current med. F/u in 3 months, will order labs then    Osteoporosis  Stable, continue current meds.  Will order repeat dexa at her next visit.      Return in about 3 months (around 6/6/2017).  "

## 2017-03-13 ENCOUNTER — TELEPHONE (OUTPATIENT)
Dept: MEDICAL GROUP | Facility: MEDICAL CENTER | Age: 77
End: 2017-03-13

## 2017-03-13 ENCOUNTER — HOSPITAL ENCOUNTER (OUTPATIENT)
Dept: LAB | Facility: MEDICAL CENTER | Age: 77
End: 2017-03-13
Attending: INTERNAL MEDICINE
Payer: MEDICARE

## 2017-03-13 LAB
BUN SERPL-MCNC: 10 MG/DL (ref 8–22)
CREAT SERPL-MCNC: 0.73 MG/DL (ref 0.5–1.4)

## 2017-03-13 PROCEDURE — 36415 COLL VENOUS BLD VENIPUNCTURE: CPT

## 2017-03-13 PROCEDURE — 84520 ASSAY OF UREA NITROGEN: CPT

## 2017-03-13 PROCEDURE — 82565 ASSAY OF CREATININE: CPT

## 2017-03-13 NOTE — TELEPHONE ENCOUNTER
VOICEMAIL  1. Caller Name: Bethany                    Call Back Number: [phone] (home)       2. Message: Left VM for the patient to call back regarding the patients referrals to pulmonary and psychology.     3. Patient approves office to leave a detailed voicemail/MyChart message: N\A

## 2017-03-14 ENCOUNTER — TELEPHONE (OUTPATIENT)
Dept: MEDICAL GROUP | Facility: MEDICAL CENTER | Age: 77
End: 2017-03-14

## 2017-03-14 NOTE — TELEPHONE ENCOUNTER
Patient has been advised about providers message, Patient stated that she does not need any refills at this time.

## 2017-03-14 NOTE — TELEPHONE ENCOUNTER
Patient called back she stated that she made an appointment for both Pulmonary and Psychology and that her appointments are not until 5/2017, Patient wanted to know if you wanted her to do something different? Thank you Please advise

## 2017-03-14 NOTE — TELEPHONE ENCOUNTER
Discussed with pt. Will decrease sertraline back down to 150mg daily.  We also discussed trying to lower her BP meds if her BPs were low.  She plans to check BPs now, will follow up if needed

## 2017-03-14 NOTE — TELEPHONE ENCOUNTER
1. Caller Name: Janell                      Call Back Number: [phone]     2. Message: Patient wants to speak with you in regards to her medication and problems she has been having, she has been feeling dizzy and also she also says she is having out of the blue uncontrolled running.   I offered to schedule an appt. But patient insisted on speaking to you before she did.    3. Patient approves office to leave a detailed voicemail/MyChart message: N\A

## 2017-03-16 ENCOUNTER — OFFICE VISIT (OUTPATIENT)
Dept: NEUROLOGY | Facility: MEDICAL CENTER | Age: 77
End: 2017-03-16
Payer: MEDICARE

## 2017-03-16 VITALS
SYSTOLIC BLOOD PRESSURE: 116 MMHG | DIASTOLIC BLOOD PRESSURE: 64 MMHG | BODY MASS INDEX: 25.07 KG/M2 | OXYGEN SATURATION: 96 % | RESPIRATION RATE: 16 BRPM | HEIGHT: 66 IN | WEIGHT: 156 LBS | TEMPERATURE: 98.4 F | HEART RATE: 64 BPM

## 2017-03-16 DIAGNOSIS — R27.0 ATAXIA: Primary | ICD-10-CM

## 2017-03-16 PROCEDURE — 1101F PT FALLS ASSESS-DOCD LE1/YR: CPT | Performed by: PSYCHIATRY & NEUROLOGY

## 2017-03-16 PROCEDURE — G8420 CALC BMI NORM PARAMETERS: HCPCS | Performed by: PSYCHIATRY & NEUROLOGY

## 2017-03-16 PROCEDURE — G8432 DEP SCR NOT DOC, RNG: HCPCS | Performed by: PSYCHIATRY & NEUROLOGY

## 2017-03-16 PROCEDURE — 1036F TOBACCO NON-USER: CPT | Performed by: PSYCHIATRY & NEUROLOGY

## 2017-03-16 PROCEDURE — 99205 OFFICE O/P NEW HI 60 MIN: CPT | Performed by: PSYCHIATRY & NEUROLOGY

## 2017-03-16 PROCEDURE — G8482 FLU IMMUNIZE ORDER/ADMIN: HCPCS | Performed by: PSYCHIATRY & NEUROLOGY

## 2017-03-16 PROCEDURE — 4040F PNEUMOC VAC/ADMIN/RCVD: CPT | Performed by: PSYCHIATRY & NEUROLOGY

## 2017-03-16 ASSESSMENT — ENCOUNTER SYMPTOMS
DOUBLE VISION: 0
DIZZINESS: 1
LOSS OF CONSCIOUSNESS: 0
DEPRESSION: 0
SPEECH CHANGE: 0
SEIZURES: 0
HEADACHES: 0
FALLS: 1
WEIGHT LOSS: 0
TREMORS: 0
MEMORY LOSS: 0

## 2017-03-16 NOTE — MR AVS SNAPSHOT
"        Janell Armstrongcindy   3/16/2017 3:00 PM   Office Visit   MRN: [id number]    Department:  [facility]   Dept Phone:  [phone]    Description:  Female : 1940   Provider:  Santosh Smith M.D.           Reason for Visit     New Patient dizziness      Allergies as of 3/16/2017     No Known Allergies      You were diagnosed with     Ataxia   [117284]  -  Primary       Vital Signs     Blood Pressure Pulse Temperature Respirations Height Weight    116/64 mmHg 64 36.9 °C (98.4 °F) 16 1.664 m (5' 5.51\") 70.761 kg (156 lb)    Body Mass Index Oxygen Saturation Smoking Status             25.56 kg/m2 96% Never Smoker          Basic Information     Date Of Birth Sex Race Ethnicity Preferred Language    1940 Female White Non- English      Your appointments     2017  2:00 PM   MR ZHENG WITH/WO with [facility] MRI 1   [facility] IMAGING - MRI - [facility]    [address]  [place] [address] [phone]            May 11, 2017  2:40 PM   New Patient with C Rotation   [facility] (--)    [address]  [place] [address] [phone]           Please bring enclosed paperwork completed along with your insurance card and photo ID.            May 22, 2017  2:00 PM   Initial Behavioral Health Eval with Viry Brooks, [facility]   BEHAVIORAL HEALTH [address] [facility] [address]    [address]  [place] [address]   [phone]            2017  2:20 PM   Established Patient with Amairani Bello M.D.   [facility] [address]    [address]  [address]  [place] [address] [phone]           You will be receiving a confirmation call a few days before your appointment from our automated call confirmation system.            2017  2:20 PM   Follow Up Visit with Santosh Smith M.D.   [facility] Neurology (--)    [address]  [place] [address]   "   [phone]           You will be receiving a confirmation call a few days before your appointment from our automated call confirmation system.              Problem List              ICD-10-CM Priority Class Noted - Resolved    Pancreatic cyst K86.2   10/22/2012 - Present    Hypothyroid E03.9   1/31/2013 - Present    Essential hypertension I10   1/31/2013 - Present    Hyperlipemia E78.5   7/25/2014 - Present    Insomnia G47.00   7/25/2014 - Present    Osteoporosis M81.0   1/13/2015 - Present    Seasonal asthma J45.998   4/27/2015 - Present    MEDICAL HOME    5/22/2015 - Present    Situational mixed anxiety and depressive disorder F43.23   12/4/2015 - Present    Caregiver with fatigue R53.83   9/7/2016 - Present      Health Maintenance        Date Due Completion Dates    IMM DTaP/Tdap/Td Vaccine (1 - Tdap) 2/19/1959 ---    COLONOSCOPY 1/13/2020 1/13/2010 (Done)    Override on 1/13/2010: Done (DR Duran-every 5 years)    BONE DENSITY 5/8/2020 5/8/2015, 4/27/2011 (Done)    Override on 4/27/2011: Done (Life screening test)            Current Immunizations     13-VALENT PCV PREVNAR 10/31/2015    Influenza Vaccine Adult HD 10/11/2016, 10/31/2015    Pneumococcal polysaccharide vaccine (PPSV-23) 11/3/2006    SHINGLES VACCINE 10/30/2013      Below and/or attached are the medications your provider expects you to take. Review all of your home medications and newly ordered medications with your provider and/or pharmacist. Follow medication instructions as directed by your provider and/or pharmacist. Please keep your medication list with you and share with your provider. Update the information when medications are discontinued, doses are changed, or new medications (including over-the-counter products) are added; and carry medication information at all times in the event of emergency situations     Allergies:  No Known Allergies          Medications  Valid as of: March 16, 2017 -  3:45 PM    Generic Name Brand Name Tablet  Size Instructions for use    ALPRAZolam (Tab) XANAX 0.5 MG Take 1 Tab by mouth at bedtime as needed for Sleep.        Atorvastatin Calcium (Tab) LIPITOR 10 MG Take 1 Tab by mouth every day. Indications: high cholesterol        Azelastine HCl   by Ophthalmic route every day.        Benazepril HCl (Tab) LOTENSIN 40 MG Take 1 Tab by mouth every day. Indications: High Blood Pressure        Cetirizine HCl (Tab) ZYRTEC 10 MG Take 10 mg by mouth every day.        Cholecalciferol (Cap) Vitamin D 2000 UNITS Take 1 Cap by mouth every day.        Estrogens, Conjugated (Cream) PREMARIN 0.625 MG/GM Insert 0.5 g in vagina every day.        Furosemide (Tab) LASIX 20 MG TAKE 1 TAB BY MOUTH EVERY DAY.        Ipratropium Bromide (Solution) ATROVENT 0.02 % 0.2 mg by Nebulization route 4 times a day.        Levothyroxine Sodium (Tab) SYNTHROID 88 MCG TAKE 1 TAB BY MOUTH EVERY DAY. BRAND NAME ONLY INDICATIONS: UNDERACTIVE THYROID        Magnesium Chloride (Tab CR) SLO- (64 MG) MG Take 535 mg by mouth every day.        Multiple Vitamins-Minerals (Tab) THERAGRAN-M  Take 1 Tab by mouth every day.        Oyster Shell (Tab) OS-CUBA 500 500 MG Take 500 mg by mouth 2 times a day, with meals.        Polyethylene Glycol 3350 (Pack) MIRALAX  Take 17 g by mouth every day.        Probiotic Product   Take  by mouth every day.          Sertraline HCl (Tab) ZOLOFT 100 MG Take 2 Tabs by mouth every day.        Zolpidem Tartrate (Tab) AMBIEN 10 MG Take 1 Tab by mouth at bedtime as needed for Sleep.        .                 Medicines prescribed today were sent to:     [facility]/PHARMACY #[id number] - [place] - [address]    [address] [place] [address]    Phone: [phone] Fax: [phone]    Open 24 Hours?: No      Medication refill instructions:       If your prescription bottle indicates you have medication refills left, it is not necessary to call your provider’s office. Please contact your pharmacy and they will refill your  medication.    If your prescription bottle indicates you do not have any refills left, you may request refills at any time through one of the following ways: The online [employer] system (except Urgent Care), by calling your provider’s office, or by asking your pharmacy to contact your provider’s office with a refill request. Medication refills are processed only during regular business hours and may not be available until the next business day. Your provider may request additional information or to have a follow-up visit with you prior to refilling your medication.   *Please Note: Medication refills are assigned a new Rx number when refilled electronically. Your pharmacy may indicate that no refills were authorized even though a new prescription for the same medication is available at the pharmacy. Please request the medicine by name with the pharmacy before contacting your provider for a refill.        Your To Do List     Future Labs/Procedures Complete By Expires    MR-BRAIN-W/O  As directed 3/16/2018      Other Notes About Your Plan     Patient is enrolled in [facility] with Dr. Gracia.           [employer] Access Code: Activation code not generated  Current [employer] Status: Active

## 2017-03-16 NOTE — PROGRESS NOTES
Subjective:      Janell Tinsley is a 77 y.o. female who presents for consultation from the office of Dr. Bello who presents with a several week history of episodic dizziness and isolated episodes of a sudden onset of involuntary movement.     FARHAT Maciel is a pleasant 77-year-old right-handed  female whose symptoms began about one month ago, she describes dizziness that occurs when she would stand up typically, especially if she stands too quickly. If she stands more slowly the dizziness is less profound. The symptoms resolved quickly if she would stand still or what she usually does is sit back down with more immediate response. There is no actual vertigo, she does suffer from chronic tinnitus, this does not worsen, she denies visual distortions or double vision, focal motor sensory distortions, slurring of speech, etc. There are never sequelae. She has never had a history of these in the past. She thinks these may be occurring more often because of her elevated anxiety levels. The caretaker for her , also a patient of mine who suffers from significant dementia, anxiety levels are always high for her. She also began to suffer from sinus-related symptoms from her allergies. She has had allergies for years, needless to say, and she has never had symptoms like this. The symptoms were never preceded by severe trauma, she has had no symptoms suggestive of otitis media, or an upper respiratory infection. Her Zoloft had been increased in dosing around this time, though she thinks it was after the symptoms started. There certainly was no real change in the symptoms after the dose was increased.    She has on 3 different occasions, suffered from a sudden perception of movement. She feels as if she has to look down towards the floor, and then she has to walk quickly because she seems unsteady. If she does not grab onto an object, she will fall to the ground. It is always a walking forwards, the head  movement is always downwards. The episodes are brief, they always resolve. These are independent of her dizziness. She again denies any other associated symptoms. They've always occurred when she has been standing for a time. In general she denies tremor, loss of taste or smell, constipation, loss of dexterity with the hands, focal motor deficits, walking is not normally curtailed, she does not shuffle, stride length is maintained. A directed workup has not been ordered. She did talk with her primary care physician about the symptoms, she contacted her pharmacist about medication side effects, she was told that some of the symptoms suggested Parkinson's disease. She now presents.    Medical, surgical and family histories are reviewed, there are no new drug allergies. There is no history of diagnosed neurodegenerative disease, malignancy, stroke, MS, diabetes, liver or kidney disease, anemia, blood dyscrasia or autoimmune disease. There is no surgical history of note. No one in her family has a history of Parkinson's disease or Alzheimer's disease, stroke, or Ménière's disease. She does not smoke or drink. As above, she is a full-time caregiver for her .    Medications were reviewed, no recent dosage changes have been instituted other than Zoloft, she is on Ambien and Xanax at nighttime for sleep, Synthroid, Lotensin, Lipitor, Lasix, Premarin, the rest as per the records, noncontributory from a neurologic standpoint.    Review of Systems   Constitutional: Negative for weight loss.   Eyes: Negative for double vision.   Cardiovascular: Negative for leg swelling.   Musculoskeletal: Positive for falls.   Neurological: Positive for dizziness. Negative for tremors, speech change, seizures, loss of consciousness and headaches.   Psychiatric/Behavioral: Negative for depression and memory loss.   All other systems reviewed and are negative.       Objective:     /64 mmHg  Pulse 64  Temp(Src) 36.9 °C (98.4 °F)   "Resp 16  Ht 1.664 m (5' 5.51\")  Wt 70.761 kg (156 lb)  BMI 25.56 kg/m2  SpO2 96%     Physical Exam    She appears in no acute distress. Vital signs are stable. There is no malar rash, sialorrhea or temporal tenderness. The neck is supple. Wilbur and Velasco show maintained air and bone conduction ratios bilaterally, there is no lateralization. Carotid pulses are present bilaterally without asymmetry or bruits. Cardiac evaluation reveals a regular rhythm.    Fully oriented, there is no aphasia, apraxia, or inattention.    PERRLA/EOMI, there are no nystagmus, visual fields are full to finger counting confrontation, there is no hypophonia or tachyphemia. Funduscopic exam is benign. Facial movements are symmetric, there is no sensory loss across the midline to temperature and light touch, the tongue and uvular midline, shoulder shrug is symmetric.    Tone is normal throughout, there is no tremor, asterixis, or drift. Strength is 5/5, reflexes are present without asymmetries, the ankle jerks are relatively diminished, toes are downgoing.    Gait is of normal station, there is no appendicular dystaxia. Repetitive movements with the hands, fingers and feet are also intact and symmetric with normal amplitude and frequencies. Heel and toe walking are intact.    Romberg is absent, sensory exam is intact to vibration and temperature bilaterally.     Assessment/Plan:     1. Ataxia  The dizziness is most likely a benign, positional process related to a labyrinthine or vestibular body dysfunction. I doubt structural pathologies exist though this is a rather subacute onset. The episodes of movement perception and her sudden downward gaze and forward walking are also very unusual constellation, again a vestibular issue can often present with subjective perceptions of movement and the need to do so to maintain balance. Again, MRI scan of the brain would be appropriate for both of these symptoms. This is not Parkinson's disease as " she had heard mentioned by her pharmacist. They are not likely medication side effects. It is not migrainous in nature, I doubt that these are symptoms of TIA, though she has risk for atherosclerosis.    Face-to-face time spent reviewing all the above. MRI scan of the brain will be obtained, depending on these results, further workup may be necessary. She will observe to see what happens with these symptoms and if they were to suddenly increase. I will follow-up with her over the next couple months, when her  is also scheduled to follow-up with me, to make things easier. We will call her with results of the MRI if needed.    - MR-BRAIN-W/O; Future    Time: Evaluation 60 minutes for exam, review, discussion, and education  Discussion: As mentioned in assessment, over 50% of the time spent face-to-face counseling and coordinating care

## 2017-04-01 ENCOUNTER — HOSPITAL ENCOUNTER (OUTPATIENT)
Dept: RADIOLOGY | Facility: MEDICAL CENTER | Age: 77
End: 2017-04-01
Attending: INTERNAL MEDICINE
Payer: MEDICARE

## 2017-04-01 ENCOUNTER — HOSPITAL ENCOUNTER (OUTPATIENT)
Dept: RADIOLOGY | Facility: MEDICAL CENTER | Age: 77
End: 2017-04-01
Attending: PSYCHIATRY & NEUROLOGY
Payer: MEDICARE

## 2017-04-01 DIAGNOSIS — K59.00 UNSPECIFIED CONSTIPATION: ICD-10-CM

## 2017-04-01 DIAGNOSIS — R27.0 ATAXIA: ICD-10-CM

## 2017-04-01 DIAGNOSIS — K86.2 PANCREAS CYST: ICD-10-CM

## 2017-04-01 PROCEDURE — 700117 HCHG RX CONTRAST REV CODE 255: Performed by: INTERNAL MEDICINE

## 2017-04-01 PROCEDURE — A9579 GAD-BASE MR CONTRAST NOS,1ML: HCPCS | Performed by: INTERNAL MEDICINE

## 2017-04-01 PROCEDURE — 74183 MRI ABD W/O CNTR FLWD CNTR: CPT

## 2017-04-01 RX ADMIN — GADODIAMIDE 15 ML: 287 INJECTION INTRAVENOUS at 16:45

## 2017-04-06 ENCOUNTER — TELEPHONE (OUTPATIENT)
Dept: NEUROLOGY | Facility: MEDICAL CENTER | Age: 77
End: 2017-04-06

## 2017-04-07 NOTE — TELEPHONE ENCOUNTER
Please call the patient and tell her the MRI scan of her brain is stable and unremarkable. I'm quite pleased. We can talk specifics when I follow up with her at her next office visit. JS    Called and spoke with pt. All information was given. Pt was also pleased. She will keep the follow up appointment with Dr. Smith. KA

## 2017-04-10 ENCOUNTER — TELEPHONE (OUTPATIENT)
Dept: MEDICAL GROUP | Facility: MEDICAL CENTER | Age: 77
End: 2017-04-10

## 2017-04-10 DIAGNOSIS — R42 VERTIGO: ICD-10-CM

## 2017-04-10 NOTE — TELEPHONE ENCOUNTER
1. Caller Name: Janell Tinsley                                           Call Back Number: [phone]        Patient approves a detailed voicemail message: N\A    Patient stated that she called the ENT to get advise but the there is such a long wait to get into the ENT that they told the patient to call her PCP, Patient stated that she is having a lot of equal liberum with balance and dizziness, Janell stated that she had a brain scan done on 4/1 and she wanted to know if you tell if it is an enter ear infection? Please advise

## 2017-04-10 NOTE — TELEPHONE ENCOUNTER
Probably an inner ear issue, i would recommend physical therapy to help with the balance and dizziness. i have placed the referral.

## 2017-04-10 NOTE — TELEPHONE ENCOUNTER
Phone Number Called: [phone]    Message: Left VM for the patient to call back to give doctor message to the patient regarding referral.    Left Message for patient to call back: N\A

## 2017-04-11 NOTE — TELEPHONE ENCOUNTER
Patient stated that she does not want to go to Physical Therapy, patient wants to see an ENT? Please advise

## 2017-04-12 DIAGNOSIS — I10 ESSENTIAL HYPERTENSION: ICD-10-CM

## 2017-04-12 NOTE — TELEPHONE ENCOUNTER
Was the patient seen in the last year in this department? Yes   3/06/17    Does patient have an active prescription for medications requested? No     Received Request Via: Pharmacy

## 2017-04-13 ENCOUNTER — TELEPHONE (OUTPATIENT)
Dept: MEDICAL GROUP | Facility: MEDICAL CENTER | Age: 77
End: 2017-04-13

## 2017-04-13 RX ORDER — FUROSEMIDE 20 MG/1
20 TABLET ORAL
Qty: 90 TAB | Refills: 2 | Status: SHIPPED | OUTPATIENT
Start: 2017-04-13 | End: 2018-02-13 | Stop reason: SDUPTHER

## 2017-04-13 NOTE — TELEPHONE ENCOUNTER
Discussed her sleep, she wants to come off the xanax and ambien. She tried a half ambien and that seemed to work so will continue that and slowly taper off.  She also has an appt for cbt/therapy in may.  Is not interested in a sleep study at this time as she cannot be away from home as she cares for her .  Also recommended trying valerian root, she hasn't tried that yet.

## 2017-05-08 ENCOUNTER — HOSPITAL ENCOUNTER (OUTPATIENT)
Dept: RADIOLOGY | Facility: MEDICAL CENTER | Age: 77
End: 2017-05-08
Attending: PHYSICAL MEDICINE & REHABILITATION
Payer: MEDICARE

## 2017-05-08 DIAGNOSIS — M54.2 CERVICALGIA: ICD-10-CM

## 2017-05-08 PROCEDURE — 72040 X-RAY EXAM NECK SPINE 2-3 VW: CPT

## 2017-05-09 ENCOUNTER — TELEPHONE (OUTPATIENT)
Dept: MEDICAL GROUP | Facility: MEDICAL CENTER | Age: 77
End: 2017-05-09

## 2017-05-09 NOTE — TELEPHONE ENCOUNTER
1. Caller Name: Janell Tinsley                                           Call Back Number: [phone] (home)         Patient approves a detailed voicemail message: N\A    Patient called she did not want to talk to me to give me any information and wanted Dr. Bello to call her back, I let the patient know that I would relay the message, I asked the patient if she wanted Dr. Bello's  MA to call her back and she stated if you transferred me right now so that I can speak to her and not wait, I let the patient know that I would give the message for Nighat to call the patient and the patient became upset and stated that she would call back and ask to speak to the doctor or the doctors medical assistant and then hung up.

## 2017-05-10 NOTE — TELEPHONE ENCOUNTER
Called patient to see how we can help her.. Per patient she doesn't recall. I told pt if she remember to give us a call back

## 2017-05-22 ENCOUNTER — OFFICE VISIT (OUTPATIENT)
Dept: BEHAVIORAL HEALTH | Facility: PHYSICIAN GROUP | Age: 77
End: 2017-05-22
Payer: MEDICARE

## 2017-05-22 DIAGNOSIS — F43.21 ADJUSTMENT DISORDER WITH DEPRESSED MOOD: ICD-10-CM

## 2017-05-22 PROCEDURE — 1036F TOBACCO NON-USER: CPT | Performed by: SOCIAL WORKER

## 2017-05-22 PROCEDURE — G8432 DEP SCR NOT DOC, RNG: HCPCS | Performed by: SOCIAL WORKER

## 2017-05-22 PROCEDURE — 90791 PSYCH DIAGNOSTIC EVALUATION: CPT | Performed by: SOCIAL WORKER
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
## 2017-06-06 ENCOUNTER — TELEPHONE (OUTPATIENT)
Dept: MEDICAL GROUP | Facility: MEDICAL CENTER | Age: 77
End: 2017-06-06

## 2017-06-06 NOTE — TELEPHONE ENCOUNTER
Future Appointments       Provider Department Center    6/8/2017 2:20 PM Amairani Belol M.D. [facility] [address]    6/21/2017 4:00 PM [facility]    7/7/2017 2:20 PM Santosh Smith M.D. [facility]     7/11/2017 4:00 PM Beth[facility] BRISA Rodriguez    8/1/2017 4:00 PM Bethany [facility] M[facility]ABE McCabe          ESTABLISHED PATIENT PRE-VISIT PLANNING     Note: Patient will not be contacted if there is no indication to call. PT was not Contacted.    1.    Reviewed note from last office visit with PCP: YES Last office visit: 05/22/17    2.  If any orders were placed at last visit, do we have Results/Consult Notes?        •  Labs - Labs were not ordered at last office visit.        •  Imaging - Imaging was not ordered at last office visit.        •  Referrals - No referrals were ordered at last office visit.     3.  Immunizations were updated in Epic using WebIZ?: Yes       •  Web Iz Recommendations: HEPATITIS A  TDAP    4.  Patient is due for the following Health Maintenance Topics:   Health Maintenance Due   Topic Date Due   • IMM DTaP/Tdap/Td Vaccine (1 - Tdap) 02/19/1959       5.  Patient was not informed to arrive 15 min prior to their scheduled appointment and bring in their medication bottles.

## 2017-06-08 ENCOUNTER — OFFICE VISIT (OUTPATIENT)
Dept: MEDICAL GROUP | Facility: MEDICAL CENTER | Age: 77
End: 2017-06-08
Payer: MEDICARE

## 2017-06-08 VITALS
BODY MASS INDEX: 26.99 KG/M2 | HEIGHT: 65 IN | SYSTOLIC BLOOD PRESSURE: 124 MMHG | OXYGEN SATURATION: 96 % | RESPIRATION RATE: 16 BRPM | DIASTOLIC BLOOD PRESSURE: 72 MMHG | WEIGHT: 162 LBS | HEART RATE: 74 BPM | TEMPERATURE: 98.6 F

## 2017-06-08 DIAGNOSIS — F51.01 PRIMARY INSOMNIA: ICD-10-CM

## 2017-06-08 DIAGNOSIS — I10 ESSENTIAL HYPERTENSION: ICD-10-CM

## 2017-06-08 DIAGNOSIS — E78.00 PURE HYPERCHOLESTEROLEMIA: ICD-10-CM

## 2017-06-08 DIAGNOSIS — E03.9 ACQUIRED HYPOTHYROIDISM: ICD-10-CM

## 2017-06-08 DIAGNOSIS — R53.83 CAREGIVER WITH FATIGUE: ICD-10-CM

## 2017-06-08 PROCEDURE — 99214 OFFICE O/P EST MOD 30 MIN: CPT | Performed by: FAMILY MEDICINE

## 2017-06-08 NOTE — MR AVS SNAPSHOT
"Janell Tinsley   2017 2:20 PM   Office Visit   MRN: [id number]    Department:  [address] [place] [address]   Dept Phone:  [phone]    Description:  Female : 1940   Provider:  Amairani Bello M.D.           Reason for Visit     Hyperlipidemia           Allergies as of 2017     No Known Allergies      You were diagnosed with     Primary insomnia   [357966]       Caregiver with fatigue   [[id number]]         Vital Signs     Blood Pressure Pulse Temperature Respirations Height Weight    124/72 mmHg 74 37 °C (98.6 °F) 16 1.651 m (5' 5\") 73.483 kg (162 lb)    Body Mass Index Oxygen Saturation Smoking Status             26.96 kg/m2 96% Never Smoker          Basic Information     Date Of Birth Sex Race Ethnicity Preferred Language    1940 Female White Non- English      Your appointments     2017  4:00 PM   Individual Therapy with Bethany Lorenzo L.C.S.W.   [facility]    [address]  [address]  [facility] [id number] [phone]            2017  2:20 PM   Follow Up Visit with Santosh Smith M.D.   [facility] Neu[facility] (--)    [address], [address]  [facility] [address] [phone]           You will be receiving a confirmation call a few days before your appointment from our automated call confirmation system.            2017  4:00 PM   Individual Therapy with Bethany Lorenzo L.C.S.W.   [facility]    [address]  [address]  [facility] [id number] [phone]            Aug 01, 2017  4:00 PM   Individual Therapy with Bethany Lorenzo L.C.S.W.   [facility]    [address]  [address]  [facility] [id number] [phone]            Sep 08, 2017  2:40 PM   Established Patient with Amairani Bello M.D.   [facility] [address]    [address] [facility]  [address]  [facility] [address] [phone]           You will be receiving a confirmation call a few days " before your appointment from our automated call confirmation system.              Problem List              ICD-10-CM Priority Class Noted - Resolved    Pancreatic cyst K86.2   10/22/2012 - Present    Hypothyroid E03.9   1/31/2013 - Present    Essential hypertension I10   1/31/2013 - Present    Hyperlipemia E78.5   7/25/2014 - Present    Insomnia G47.00   7/25/2014 - Present    Osteoporosis M81.0   1/13/2015 - Present    Seasonal asthma J45.998   4/27/2015 - Present    MEDICAL HOME    5/22/2015 - Present    Situational mixed anxiety and depressive disorder F43.23   12/4/2015 - Present    Caregiver with fatigue R53.83   9/7/2016 - Present      Health Maintenance        Date Due Completion Dates    IMM DTaP/Tdap/Td Vaccine (1 - Tdap) 2/19/1959 ---    COLONOSCOPY 1/13/2020 1/13/2010 (Done)    Override on 1/13/2010: Done (DR Duran-every 5 years)    BONE DENSITY 5/8/2020 5/8/2015, 4/27/2011 (Done)    Override on 4/27/2011: Done (Life screening test)            Current Immunizations     13-VALENT PCV PREVNAR 10/31/2015    Influenza Vaccine Adult HD 10/11/2016, 10/31/2015    Pneumococcal polysaccharide vaccine (PPSV-23) 11/3/2006    SHINGLES VACCINE 10/30/2013      Below and/or attached are the medications your provider expects you to take. Review all of your home medications and newly ordered medications with your provider and/or pharmacist. Follow medication instructions as directed by your provider and/or pharmacist. Please keep your medication list with you and share with your provider. Update the information when medications are discontinued, doses are changed, or new medications (including over-the-counter products) are added; and carry medication information at all times in the event of emergency situations     Allergies:  No Known Allergies          Medications  Valid as of: June 08, 2017 -  2:44 PM    Generic Name Brand Name Tablet Size Instructions for use    ALPRAZolam (Tab) XANAX 0.5 MG Take 1 Tab by mouth at  bedtime as needed for Sleep.        Atorvastatin Calcium (Tab) LIPITOR 10 MG Take 1 Tab by mouth every day. Indications: high cholesterol        Azelastine HCl   by Ophthalmic route every day.        Benazepril HCl (Tab) LOTENSIN 40 MG Take 1 Tab by mouth every day. Indications: High Blood Pressure        Cetirizine HCl (Tab) ZYRTEC 10 MG Take 10 mg by mouth every day.        Cholecalciferol (Cap) Vitamin D 2000 UNITS Take 1 Cap by mouth every day.        Estrogens, Conjugated (Cream) PREMARIN 0.625 MG/GM Insert 0.5 g in vagina every day.        Furosemide (Tab) LASIX 20 MG Take 1 Tab by mouth every day. Indications: High Blood Pressure        Ipratropium Bromide (Solution) ATROVENT 0.02 % 0.2 mg by Nebulization route 4 times a day.        Levothyroxine Sodium (Tab) SYNTHROID 88 MCG Take 1 Tab by mouth Every morning on an empty stomach.        Magnesium Chloride (Tab CR) SLO- (64 MG) MG Take 535 mg by mouth every day.        Multiple Vitamins-Minerals (Tab) THERAGRAN-M  Take 1 Tab by mouth every day.        Oyster Shell (Tab) OS-CUBA 500 500 MG Take 500 mg by mouth 2 times a day, with meals.        Polyethylene Glycol 3350 (Pack) MIRALAX  Take 17 g by mouth every day.        Probiotic Product   Take  by mouth every day.          Sertraline HCl (Tab) ZOLOFT 100 MG Take 2 Tabs by mouth every day.        Zolpidem Tartrate (Tab) AMBIEN 10 MG Take 1 Tab by mouth at bedtime as needed for Sleep.        .                 Medicines prescribed today were sent to:     [id number] - [place] - [address]    [address] [place] [address]    Phone: [phone] Fax: [phone]    Open 24 Hours?: No      Medication refill instructions:       If your prescription bottle indicates you have medication refills left, it is not necessary to call your provider’s office. Please contact your pharmacy and they will refill your medication.    If your prescription bottle indicates you do not have any refills  left, you may request refills at any time through one of the following ways: The online "[employer]" system (except Urgent Care), by calling your provider’s office, or by asking your pharmacy to contact your provider’s office with a refill request. Medication refills are processed only during regular business hours and may not be available until the next business day. Your provider may request additional information or to have a follow-up visit with you prior to refilling your medication.   *Please Note: Medication refills are assigned a new Rx number when refilled electronically. Your pharmacy may indicate that no refills were authorized even though a new prescription for the same medication is available at the pharmacy. Please request the medicine by name with the pharmacy before contacting your provider for a refill.        Other Notes About Your Plan     Patient is enrolled in [facility] with Dr. Gracia.           [employer] Access Code: Activation code not generated  Current "[employer] Status: Active

## 2017-06-09 NOTE — PROGRESS NOTES
cc:  sleep    Subjective:     Janell Tinsley is a 77 y.o. female presenting for a follow up    1. Insomnia: has been able to cut down to xanax 0.25mg and ambien 5mg nightly (was on xanax 0.5 and ambien 10).  She tried melatonin again but is not as helpful.  Has also started seeing a psychologist.  Continues to care for her  with dementia, he seems to be deteriorating.  However, they also went to [place] to visit family so the travel and activity seems to have worn him out as well.    2. HTN: has hx of arterial hypertension, stable on lasix and benzepril. Denies any chest pain, shortness of breath, leg swelling, jaw claudication, lightheadedness, dizziness.  Last cmp was 4/2016    3. HLD: is on atorvastatin 10mg daily. No myalgias. Last lipids 4/2016    4.  Hypothyroidism: has hypothyroidism, is on synthroid 88 mcg daily.  Denies any heat/cold intolerance, diarrhea/constipation, abdominal pain, tremors, skin changes. Last tsh 4/2016    Review of systems:  See above.         Current outpatient prescriptions:   •  SYNTHROID 88 MCG Tab, Take 1 Tab by mouth Every morning on an empty stomach., Disp: 90 Tab, Rfl: 3  •  furosemide (LASIX) 20 MG Tab, Take 1 Tab by mouth every day. Indications: High Blood Pressure, Disp: 90 Tab, Rfl: 2  •  atorvastatin (LIPITOR) 10 MG Tab, Take 1 Tab by mouth every day. Indications: high cholesterol, Disp: 90 Tab, Rfl: 3  •  sertraline (ZOLOFT) 100 MG Tab, Take 2 Tabs by mouth every day. (Patient taking differently: Take 150 mg by mouth every day.), Disp: 90 Tab, Rfl: 1  •  benazepril (LOTENSIN) 40 MG tablet, Take 1 Tab by mouth every day. Indications: High Blood Pressure, Disp: 90 Tab, Rfl: 0  •  alprazolam (XANAX) 0.5 MG Tab, Take 1 Tab by mouth at bedtime as needed for Sleep. (Patient taking differently: Take 0.25 mg by mouth at bedtime as needed for Sleep.), Disp: 90 Tab, Rfl: 1  •  zolpidem (AMBIEN) 10 MG Tab, Take 1 Tab by mouth at bedtime as needed for Sleep. (Patient taking  "differently: Take 5 mg by mouth at bedtime as needed for Sleep.), Disp: 90 Tab, Rfl: 3  •  calcium carbonate (CALCIUM CARBONATE) 500 MG Tab, Take 500 mg by mouth 2 times a day, with meals., Disp: , Rfl:   •  magnesium chloride SR (SLO-MAG) 535 (64 MG) MG Tab CR, Take 535 mg by mouth every day., Disp: , Rfl:   •  polyethylene glycol/lytes (MIRALAX) PACK, Take 17 g by mouth every day., Disp: , Rfl:   •  therapeutic multivitamin-minerals (THERAGRAN-M) TABS, Take 1 Tab by mouth every day., Disp: , Rfl:   •  Cholecalciferol (VITAMIN D) 2000 UNITS CAPS, Take 1 Cap by mouth every day., Disp: , Rfl:   •  conjugated estrogen (PREMARIN) 0.625 MG/GM CREA, Insert 0.5 g in vagina every day., Disp: , Rfl:   •  ipratropium (ATROVENT) 0.02 % SOLN, 0.2 mg by Nebulization route 4 times a day., Disp: , Rfl:   •  Probiotic Product (PROBIOTIC FORMULA PO), Take  by mouth every day.  , Disp: , Rfl:   •  cetirizine (ZYRTEC) 10 MG TABS, Take 10 mg by mouth every day., Disp: , Rfl:   •  Azelastine HCl (OPTIVAR OP), by Ophthalmic route every day., Disp: , Rfl:     No Known Allergies    Past Medical History   Diagnosis Date   • Thyroid activity decreased      medicated   • CAD (coronary artery disease)      medicated   • Hypertension      medicated   • ASTHMA      \"seasonal\"   • Unspecified urinary incontinence    • Diverticulitis 9/2012   • Infectious disease      MRSA   • Hyperlipemia 7/25/2014   • Insomnia 7/25/2014   • Pain of left heel 1/13/2015   • MEDICAL HOME    • Back pain 7/25/2014     S/p epidural L4 radiculopathy Completed PT    • Arthritis 7/25/2014     S/P bilateral meniscectomies R knee pain > L    • History of colonic diverticulitis 1/30/2013     2 episodes in past year 2013-14      Past Surgical History   Procedure Laterality Date   • Other abdominal surgery       appy   • Gyn surgery       hysterectomy   • Other orthopedic surgery       right shoulder rotater cuff   • Other       tonsillectomy   • Knee arthroscopy  8/5/2010 " "    Performed by RINA MONTESINOS at SURGERY SAME DAY [facility] ORS   • Medial meniscectomy  8/5/2010     Performed by RINA MONTESINOS at SURGERY SAME DAY [facility] ORS   • Meniscectomy  8/5/2010     Performed by RINA MONTESINOS at SURGERY SAME DAY [facility] ORS   • Other  1970     laparotomy removed fibroid   • Gastroscopy with biopsy  10/22/2012     Performed by Santosh Medina M.D. at SURGERY [facility]   • Egd with asp/bx  10/22/2012     Performed by Santosh Medina M.D. at SURGERY [facility] ORS   • Abdominal hysterectomy total       Family History   Problem Relation Age of Onset   • Diabetes     • Hypertension     • Stroke     • Cancer     • Arthritis Mother    • Cancer Mother      cervical   • Alcohol abuse Mother    • Heart Disease Father    • Alcohol abuse Step-Father      Social History     Social History   • Marital Status:      Spouse Name: N/A   • Number of Children: N/A   • Years of Education: N/A     Occupational History   • Not on file.     Social History Main Topics   • Smoking status: Never Smoker    • Smokeless tobacco: Never Used   • Alcohol Use: No   • Drug Use: No   • Sexual Activity:     Partners: Male     Other Topics Concern   • Not on file     Social History Narrative    Former  of a company in [place].   with lewy body dementia, she is the primary caregiver       Objective:     Vitals: /72 mmHg  Pulse 74  Temp(Src) 37 °C (98.6 °F)  Resp 16  Ht 1.651 m (5' 5\")  Wt 73.483 kg (162 lb)  BMI 26.96 kg/m2  SpO2 96%  General: Alert, pleasant, NAD  HEENT: Normocephalic.    Psych:  Affect/mood is normal, judgement is good, memory is intact, grooming is appropriate.    Assessment/Plan:     Janell was seen today for hyperlipidemia.    Diagnoses and all orders for this visit:    Primary insomnia  Caregiver with fatigue  Encouraged her to continue with the half xanax and half ambien, will continue to re-assess.  Continue with therapy.    Acquired " hypothyroidism  Due for labs, continue levothyroxine  -     TSH; Future    Essential hypertension  Stable, due for labs. Continue meds  -     BASIC METABOLIC PANEL; Future    Pure hypercholesterolemia  Due for labs, continue med  -     LIPID PROFILE; Future        Return in about 3 months (around 9/8/2017).

## 2017-06-16 ENCOUNTER — HOSPITAL ENCOUNTER (OUTPATIENT)
Dept: LAB | Facility: MEDICAL CENTER | Age: 77
End: 2017-06-16
Attending: FAMILY MEDICINE
Payer: MEDICARE

## 2017-06-16 DIAGNOSIS — E03.9 ACQUIRED HYPOTHYROIDISM: ICD-10-CM

## 2017-06-16 DIAGNOSIS — I10 ESSENTIAL HYPERTENSION: ICD-10-CM

## 2017-06-16 DIAGNOSIS — E78.00 PURE HYPERCHOLESTEROLEMIA: ICD-10-CM

## 2017-06-16 LAB
ANION GAP SERPL CALC-SCNC: 8 MMOL/L (ref 0–11.9)
BUN SERPL-MCNC: 12 MG/DL (ref 8–22)
CALCIUM SERPL-MCNC: 9 MG/DL (ref 8.5–10.5)
CHLORIDE SERPL-SCNC: 108 MMOL/L (ref 96–112)
CHOLEST SERPL-MCNC: 149 MG/DL (ref 100–199)
CO2 SERPL-SCNC: 25 MMOL/L (ref 20–33)
CREAT SERPL-MCNC: 0.77 MG/DL (ref 0.5–1.4)
GFR SERPL CREATININE-BSD FRML MDRD: >60 ML/MIN/1.73 M 2
GLUCOSE SERPL-MCNC: 89 MG/DL (ref 65–99)
HDLC SERPL-MCNC: 57 MG/DL
LDLC SERPL CALC-MCNC: 65 MG/DL
POTASSIUM SERPL-SCNC: 3.5 MMOL/L (ref 3.6–5.5)
SODIUM SERPL-SCNC: 141 MMOL/L (ref 135–145)
TRIGL SERPL-MCNC: 134 MG/DL (ref 0–149)
TSH SERPL DL<=0.005 MIU/L-ACNC: 3.04 UIU/ML (ref 0.3–3.7)

## 2017-06-16 PROCEDURE — 36415 COLL VENOUS BLD VENIPUNCTURE: CPT

## 2017-06-16 PROCEDURE — 80061 LIPID PANEL: CPT

## 2017-06-16 PROCEDURE — 80048 BASIC METABOLIC PNL TOTAL CA: CPT

## 2017-06-16 PROCEDURE — 84443 ASSAY THYROID STIM HORMONE: CPT

## 2017-06-21 ENCOUNTER — OFFICE VISIT (OUTPATIENT)
Dept: BEHAVIORAL HEALTH | Facility: PHYSICIAN GROUP | Age: 77
End: 2017-06-21
Payer: MEDICARE

## 2017-06-21 DIAGNOSIS — F43.23 SITUATIONAL MIXED ANXIETY AND DEPRESSIVE DISORDER: ICD-10-CM

## 2017-06-21 PROCEDURE — 90834 PSYTX W PT 45 MINUTES: CPT | Performed by: SOCIAL WORKER

## 2017-06-21 NOTE — BH THERAPY
[facility]  Therapy Progress Note    Patient Name: Janell Tinsley  Patient MRN: [id number]  Today's Date: 6/21/2017     Type of session:Individual psychotherapy  Length of session: 45 minutes  Persons in attendance:Patient    Subjective/New Info: aJnell met with this writer for the first session. She reports feeling depressed about her current living arrangement. She indicates that her husbands health is regressing which is causing her stress. Janell indicates that she is having difficulties sleeping, concentrating and enjoying activities that she useto find pleasure in. Janell was educated ob CBT. She was asked to practice mindfulness in the next couple of weeks and journal before and after using these exercises by scaling her emotions from a scale 1-10. fam reports that she will be traveling to [facility] to visit family and discuss her move.     Objective/Observations:   Participation: Attentive and Engaged   Grooming: Casual   Cognition: Alert   Eye contact: Good   Mood: Euthymic   Affect: Congruent with content   Thought process: Logical   Speech: Rate within normal limits   Other:     Diagnoses:   1. Situational mixed anxiety and depressive disorder         Current risk:   SUICIDE: Not applicable   Homicide: Not applicable   Self-harm: Not applicable   Relapse: Not applicable   Other:    Safety Plan reviewed? Not Indicated   If evidence of imminent risk is present, intervention/plan:     Therapeutic Intervention(s): Establish rapport, Goal-setting and Problem-solving    Treatment Goal(s)/Objective(s) addressed:     Psychoeducation on distortions in thinking and how to challenge and modify disordered thinking into more realistic and flexible thinking             Progress toward Treatment Goals: Moderate improvement    Plan:  - Next appointment scheduled:  7/11/2017    Bethany Lorenzo L.C.S.W.  6/21/2017

## 2017-07-07 ENCOUNTER — APPOINTMENT (OUTPATIENT)
Dept: NEUROLOGY | Facility: MEDICAL CENTER | Age: 77
End: 2017-07-07
Payer: MEDICARE

## 2017-07-10 ENCOUNTER — APPOINTMENT (OUTPATIENT)
Dept: RADIOLOGY | Facility: IMAGING CENTER | Age: 77
End: 2017-07-10
Attending: FAMILY MEDICINE
Payer: MEDICARE

## 2017-07-10 ENCOUNTER — OFFICE VISIT (OUTPATIENT)
Dept: URGENT CARE | Facility: CLINIC | Age: 77
End: 2017-07-10
Payer: MEDICARE

## 2017-07-10 VITALS
TEMPERATURE: 97.8 F | HEIGHT: 65 IN | HEART RATE: 62 BPM | SYSTOLIC BLOOD PRESSURE: 140 MMHG | DIASTOLIC BLOOD PRESSURE: 62 MMHG | WEIGHT: 157 LBS | BODY MASS INDEX: 26.16 KG/M2 | OXYGEN SATURATION: 97 %

## 2017-07-10 DIAGNOSIS — R29.6 MULTIPLE FALLS: ICD-10-CM

## 2017-07-10 DIAGNOSIS — M54.50 ACUTE BILATERAL LOW BACK PAIN WITHOUT SCIATICA: ICD-10-CM

## 2017-07-10 DIAGNOSIS — I10 ESSENTIAL HYPERTENSION: ICD-10-CM

## 2017-07-10 DIAGNOSIS — M48.061 DEGENERATIVE LUMBAR SPINAL STENOSIS: ICD-10-CM

## 2017-07-10 LAB
APPEARANCE UR: CLEAR
BILIRUB UR STRIP-MCNC: NORMAL MG/DL
COLOR UR AUTO: NORMAL
GLUCOSE UR STRIP.AUTO-MCNC: NORMAL MG/DL
KETONES UR STRIP.AUTO-MCNC: NORMAL MG/DL
LEUKOCYTE ESTERASE UR QL STRIP.AUTO: NORMAL
NITRITE UR QL STRIP.AUTO: NORMAL
PH UR STRIP.AUTO: 5 [PH] (ref 5–8)
PROT UR QL STRIP: NORMAL MG/DL
RBC UR QL AUTO: NORMAL
SP GR UR STRIP.AUTO: 1.02
UROBILINOGEN UR STRIP-MCNC: 0.2 MG/DL

## 2017-07-10 PROCEDURE — 81002 URINALYSIS NONAUTO W/O SCOPE: CPT | Performed by: FAMILY MEDICINE

## 2017-07-10 PROCEDURE — 72100 X-RAY EXAM L-S SPINE 2/3 VWS: CPT | Mod: TC | Performed by: FAMILY MEDICINE

## 2017-07-10 PROCEDURE — 99214 OFFICE O/P EST MOD 30 MIN: CPT | Performed by: FAMILY MEDICINE

## 2017-07-10 RX ORDER — METHYLPREDNISOLONE 4 MG/1
TABLET ORAL
Qty: 21 TAB | Refills: 0 | Status: SHIPPED | OUTPATIENT
Start: 2017-07-10 | End: 2017-08-08

## 2017-07-10 ASSESSMENT — ENCOUNTER SYMPTOMS
CHILLS: 0
VOMITING: 0
BACK PAIN: 1
DIZZINESS: 1
PSYCHIATRIC NEGATIVE: 1
BLURRED VISION: 0
LOSS OF CONSCIOUSNESS: 0
FLANK PAIN: 0
HEADACHES: 0
FOCAL WEAKNESS: 0
COUGH: 0
NAUSEA: 0
PALPITATIONS: 0
FEVER: 0
WEAKNESS: 0

## 2017-07-10 NOTE — PROGRESS NOTES
Subjective:      Janell Tinsley is a 77 y.o. female who presents with Back Pain    Patient presents to urgent care with ongoing low back pain. She has a history of back problems. Recently she was visiting her grandchildren and fell several times ground-level and has had increased pain. She denies any shooting pain down her buttocks. She is finding it difficult to walk. She states that after she finishes her urine stream she gets up to be finish and feels dribble of urine. She denies any lack of sensation before she starts to urinate. Has had multiple epidural injections in her lumbar spine in the past with good relief. She has not contacted her back doctor and has instead just come to urgent care. She states she was taking some Naprosyn which was prescribed to her by the urgent care where her family lived after HER-2 falls they did some x-rays and found nothing broken she states that she did not have any relief from the Naprosyn and discontinued that she found some hydrocodone which she took that give her more relief. Patient is undergoing a workup for vertigo at the moment and that is what she feels contributed to her falls      Back Pain  Pertinent negatives include no chest pain, dysuria, fever, headaches or weakness.     Review of Systems   Constitutional: Negative for fever, chills and malaise/fatigue.   Eyes: Negative for blurred vision.   Respiratory: Negative for cough.    Cardiovascular: Negative for chest pain and palpitations.   Gastrointestinal: Negative for nausea and vomiting.   Genitourinary: Negative for dysuria, urgency, frequency, hematuria and flank pain.        Loss of urine at end of activated urine stream   Musculoskeletal: Positive for back pain.   Skin: Negative for itching and rash.   Neurological: Positive for dizziness. Negative for focal weakness, loss of consciousness, weakness and headaches.   Endo/Heme/Allergies: Negative.    Psychiatric/Behavioral: Negative.      PMH:  has a past  medical history of Thyroid activity decreased; CAD (coronary artery disease); Hypertension; ASTHMA; Infectious disease; Hyperlipemia (7/25/2014); Insomnia (7/25/2014); Pain of left heel (1/13/2015); Back pain (7/25/2014); Arthritis (7/25/2014); and History of colonic diverticulitis (1/30/2013).  MEDS:   Current outpatient prescriptions:   •  benazepril (LOTENSIN) 40 MG tablet, Take 1 Tab by mouth every day. Indications: High Blood Pressure, Disp: 90 Tab, Rfl: 3  •  SYNTHROID 88 MCG Tab, Take 1 Tab by mouth Every morning on an empty stomach., Disp: 90 Tab, Rfl: 3  •  furosemide (LASIX) 20 MG Tab, Take 1 Tab by mouth every day. Indications: High Blood Pressure, Disp: 90 Tab, Rfl: 2  •  atorvastatin (LIPITOR) 10 MG Tab, Take 1 Tab by mouth every day. Indications: high cholesterol, Disp: 90 Tab, Rfl: 3  •  sertraline (ZOLOFT) 100 MG Tab, Take 2 Tabs by mouth every day. (Patient taking differently: Take 150 mg by mouth every day.), Disp: 90 Tab, Rfl: 1  •  alprazolam (XANAX) 0.5 MG Tab, Take 1 Tab by mouth at bedtime as needed for Sleep. (Patient taking differently: Take 0.25 mg by mouth at bedtime as needed for Sleep.), Disp: 90 Tab, Rfl: 1  •  zolpidem (AMBIEN) 10 MG Tab, Take 1 Tab by mouth at bedtime as needed for Sleep. (Patient taking differently: Take 5 mg by mouth at bedtime as needed for Sleep.), Disp: 90 Tab, Rfl: 3  •  calcium carbonate (CALCIUM CARBONATE) 500 MG Tab, Take 500 mg by mouth 2 times a day, with meals., Disp: , Rfl:   •  magnesium chloride SR (SLO-MAG) 535 (64 MG) MG Tab CR, Take 535 mg by mouth every day., Disp: , Rfl:   •  polyethylene glycol/lytes (MIRALAX) PACK, Take 17 g by mouth every day., Disp: , Rfl:   •  therapeutic multivitamin-minerals (THERAGRAN-M) TABS, Take 1 Tab by mouth every day., Disp: , Rfl:   •  Cholecalciferol (VITAMIN D) 2000 UNITS CAPS, Take 1 Cap by mouth every day., Disp: , Rfl:   •  conjugated estrogen (PREMARIN) 0.625 MG/GM CREA, Insert 0.5 g in vagina every day., Disp:  ", Rfl:   •  ipratropium (ATROVENT) 0.02 % SOLN, 0.2 mg by Nebulization route 4 times a day., Disp: , Rfl:   •  Probiotic Product (PROBIOTIC FORMULA PO), Take  by mouth every day.  , Disp: , Rfl:   •  cetirizine (ZYRTEC) 10 MG TABS, Take 10 mg by mouth every day., Disp: , Rfl:   •  Azelastine HCl (OPTIVAR OP), by Ophthalmic route every day., Disp: , Rfl:   ALLERGIES: No Known Allergies  SURGHX:   Past Surgical History   Procedure Laterality Date   • Other abdominal surgery       appy   • Gyn surgery       hysterectomy   • Other orthopedic surgery       right shoulder rotater cuff   • Other       tonsillectomy   • Knee arthroscopy  8/5/2010     Performed by RINA MONTESINOS at SURGERY SAME DAY [facility] ORS   • Medial meniscectomy  8/5/2010     Performed by RINA MONTESINOS at SURGERY SAME DAY [facility] ORS   • Meniscectomy  8/5/2010     Performed by RINA MONTESINOS at SURGERY SAME DAY [facility] ORS   • Other  1970     laparotomy removed fibroid   • Gastroscopy with biopsy  10/22/2012     Performed by Santosh Medina M.D. at SURGERY [facility]   • Egd with asp/bx  10/22/2012     Performed by Santosh Medina M.D. at SURGERY [facility]   • Abdominal hysterectomy total     SOCHX:  reports that she has never smoked. She has never used smokeless tobacco. She reports that she does not drink alcohol or use illicit drugs.  FH: Family history was reviewed, no pertinent findings to report       Objective:     /62 mmHg  Pulse 62  Temp(Src) 36.6 °C (97.8 °F)  Ht 1.651 m (5' 5\")  Wt 71.215 kg (157 lb)  BMI 26.13 kg/m2  SpO2 97%     Physical Exam   Constitutional: She is oriented to person, place, and time. She appears well-developed and well-nourished.   Appears in pain when walks   Eyes: Conjunctivae are normal.   Neck: Normal range of motion.   Cardiovascular: Normal rate, regular rhythm, normal heart sounds and intact distal pulses.  Exam reveals no gallop and no friction rub.    No murmur " heard.  Pulmonary/Chest: Effort normal and breath sounds normal. No respiratory distress. She has no wheezes. She has no rales. She exhibits no tenderness.   Musculoskeletal: She exhibits tenderness. She exhibits no edema.        Lumbar back: She exhibits decreased range of motion, tenderness, bony tenderness and pain. She exhibits no spasm.        Back:    Negative straight leg raising test, motor strenght of lwoer ext equal   Neurological: She is alert and oriented to person, place, and time. She displays normal reflexes. No cranial nerve deficit. She exhibits normal muscle tone. Coordination abnormal.   Antalgic gait   Skin: Skin is warm and dry. No rash noted. She is not diaphoretic. No erythema. No pallor.   Psychiatric: She has a normal mood and affect. Her behavior is normal. Judgment and thought content normal.      diagnostics: urine dip essentially wnl                        Xray permy review degeneration prsent  No acute fracture or malalignment in the lumbar spine. Evaluation of the sacrum is limited.         Reading Provider Reading Date     Moisés Rodriguez M.D. Jul 10, 2017       Assessment/Plan:     1. Acute bilateral low back pain without sciatica  DX-LUMBAR SPINE-2 OR 3 VIEWS    POCT Urinalysis   2. Multiple falls  DX-LUMBAR SPINE-2 OR 3 VIEWS   3. Essential hypertension     4. Degenerative lumbar spinal stenosis      continued chronic medication for hypertension  ER prec given regarding cauda equina  Call back doctor!!!!

## 2017-07-10 NOTE — MR AVS SNAPSHOT
"        Janell Armstrongcindy   7/10/2017 1:45 PM   Office Visit   MRN: [id number]    Department:  [facility]   Dept Phone:  [phone]    Description:  Female : 1940   Provider:  Vaishali Shearer D.O.           Reason for Visit     Back Pain Pt fell three times       Allergies as of 7/10/2017     No Known Allergies      You were diagnosed with     Acute bilateral low back pain without sciatica   [9723592]       Multiple falls   [793973]       Essential hypertension   [1561056]       Degenerative lumbar spinal stenosis   [450279]         Vital Signs     Blood Pressure Pulse Temperature Height Weight Body Mass Index    140/62 mmHg 62 36.6 °C (97.8 °F) 1.651 m (5' 5\") 71.215 kg (157 lb) 26.13 kg/m2    Oxygen Saturation Smoking Status                97% Never Smoker           Basic Information     Date Of Birth Sex Race Ethnicity Preferred Language    1940 Female White Non- English      Your appointments     Aug 01, 2017  4:00 PM   Individual Therapy with Bethany Lorenzo L.C.S.W.   [facility]    [address]  [address]  [place] [address] [phone]            Sep 08, 2017  2:40 PM   Established Patient with Amairani Bello M.D.   [facility] [address]    [address]  [address]  [facility] [address] [phone]           You will be receiving a confirmation call a few days before your appointment from our automated call confirmation system.              Problem List              ICD-10-CM Priority Class Noted - Resolved    Pancreatic cyst K86.2   10/22/2012 - Present    Essential hypertension I10   2013 - Present    Hyperlipemia E78.5   2014 - Present    Insomnia G47.00   2014 - Present    Osteoporosis M81.0   2015 - Present    Seasonal asthma J45.998   2015 - Present    MEDICAL HOME    2015 - Present    Situational mixed anxiety and depressive disorder F43.23   2015 - Present    Caregiver with " fatigue R53.83   9/7/2016 - Present    Acquired hypothyroidism E03.9   6/8/2017 - Present      Health Maintenance        Date Due Completion Dates    IMM DTaP/Tdap/Td Vaccine (1 - Tdap) 2/19/1959 ---    IMM INFLUENZA (1) 9/1/2017 10/11/2016, 10/31/2015    COLONOSCOPY 1/13/2020 1/13/2010 (Done)    Override on 1/13/2010: Done (DR Duran-every 5 years)    BONE DENSITY 5/8/2020 5/8/2015, 4/27/2011 (Done)    Override on 4/27/2011: Done (Life screening test)            Results     POCT Urinalysis      Component Value Standard Range & Units    POC Color GOLD Negative    POC Appearance CLEAR Negative    POC Leukocyte Esterase NEG Negative    POC Nitrites NEG Negative    POC Urobiligen 0.2 Negative (0.2) mg/dL    POC Protein NEG Negative mg/dL    POC Urine PH 5.0 5.0 - 8.0    POC Blood NEG Negative    POC Specific Gravity 1.020 <1.005 - >1.030    POC Ketones NEG Negative mg/dL    POC Biliruben NEG Negative mg/dL    POC Glucose NEG Negative mg/dL                        Current Immunizations     13-VALENT PCV PREVNAR 10/31/2015    Influenza Vaccine Adult HD 10/11/2016, 10/31/2015    Pneumococcal polysaccharide vaccine (PPSV-23) 11/3/2006    SHINGLES VACCINE 10/30/2013      Below and/or attached are the medications your provider expects you to take. Review all of your home medications and newly ordered medications with your provider and/or pharmacist. Follow medication instructions as directed by your provider and/or pharmacist. Please keep your medication list with you and share with your provider. Update the information when medications are discontinued, doses are changed, or new medications (including over-the-counter products) are added; and carry medication information at all times in the event of emergency situations     Allergies:  No Known Allergies          Medications  Valid as of: July 10, 2017 -  3:05 PM    Generic Name Brand Name Tablet Size Instructions for use    ALPRAZolam (Tab) XANAX 0.5 MG Take 1 Tab by mouth  at bedtime as needed for Sleep.        Atorvastatin Calcium (Tab) LIPITOR 10 MG Take 1 Tab by mouth every day. Indications: high cholesterol        Azelastine HCl   by Ophthalmic route every day.        Benazepril HCl (Tab) LOTENSIN 40 MG Take 1 Tab by mouth every day. Indications: High Blood Pressure        Cetirizine HCl (Tab) ZYRTEC 10 MG Take 10 mg by mouth every day.        Cholecalciferol (Cap) Vitamin D 2000 UNITS Take 1 Cap by mouth every day.        Estrogens, Conjugated (Cream) PREMARIN 0.625 MG/GM Insert 0.5 g in vagina every day.        Furosemide (Tab) LASIX 20 MG Take 1 Tab by mouth every day. Indications: High Blood Pressure        Ipratropium Bromide (Solution) ATROVENT 0.02 % 0.2 mg by Nebulization route 4 times a day.        Levothyroxine Sodium (Tab) SYNTHROID 88 MCG Take 1 Tab by mouth Every morning on an empty stomach.        Magnesium Chloride (Tab CR) SLO- (64 MG) MG Take 535 mg by mouth every day.        MethylPREDNISolone (Tablet Therapy Pack) MEDROL DOSEPAK 4 MG Take as directed with food        Multiple Vitamins-Minerals (Tab) THERAGRAN-M  Take 1 Tab by mouth every day.        Oyster Shell (Tab) OS-CUBA 500 500 MG Take 500 mg by mouth 2 times a day, with meals.        Polyethylene Glycol 3350 (Pack) MIRALAX  Take 17 g by mouth every day.        Probiotic Product   Take  by mouth every day.          Sertraline HCl (Tab) ZOLOFT 100 MG Take 2 Tabs by mouth every day.        Zolpidem Tartrate (Tab) AMBIEN 10 MG Take 1 Tab by mouth at bedtime as needed for Sleep.        .                 Medicines prescribed today were sent to:     [facility]Y #[id number] - [place], [place] - [address]    [address] [place] [address]    Phone: [phone] Fax: [phone]    Open 24 Hours?: No      Medication refill instructions:       If your prescription bottle indicates you have medication refills left, it is not necessary to call your provider’s office. Please contact your pharmacy and they  will refill your medication.    If your prescription bottle indicates you do not have any refills left, you may request refills at any time through one of the following ways: The online [employer] system (except Urgent Care), by calling your provider’s office, or by asking your pharmacy to contact your provider’s office with a refill request. Medication refills are processed only during regular business hours and may not be available until the next business day. Your provider may request additional information or to have a follow-up visit with you prior to refilling your medication.   *Please Note: Medication refills are assigned a new Rx number when refilled electronically. Your pharmacy may indicate that no refills were authorized even though a new prescription for the same medication is available at the pharmacy. Please request the medicine by name with the pharmacy before contacting your provider for a refill.        Your To Do List     Future Labs/Procedures Complete By Expires    DX-LUMBAR SPINE-2 OR 3 VIEWS  As directed 1/10/2018      Other Notes About Your Plan     Patient is enrolled in [facility] with Dr. Gracia.           [employer] Access Code: Activation code not generated  Current [employer] Status: Active

## 2017-07-11 ENCOUNTER — OFFICE VISIT (OUTPATIENT)
Dept: URGENT CARE | Facility: CLINIC | Age: 77
End: 2017-07-11
Payer: MEDICARE

## 2017-07-11 ENCOUNTER — APPOINTMENT (OUTPATIENT)
Dept: BEHAVIORAL HEALTH | Facility: PHYSICIAN GROUP | Age: 77
End: 2017-07-11
Payer: MEDICARE

## 2017-07-11 ENCOUNTER — HOSPITAL ENCOUNTER (EMERGENCY)
Facility: MEDICAL CENTER | Age: 77
End: 2017-07-11
Payer: MEDICARE

## 2017-07-11 VITALS
WEIGHT: 160.72 LBS | OXYGEN SATURATION: 96 % | SYSTOLIC BLOOD PRESSURE: 142 MMHG | RESPIRATION RATE: 18 BRPM | DIASTOLIC BLOOD PRESSURE: 74 MMHG | TEMPERATURE: 97 F | HEIGHT: 64 IN | BODY MASS INDEX: 27.44 KG/M2 | HEART RATE: 66 BPM

## 2017-07-11 VITALS
SYSTOLIC BLOOD PRESSURE: 150 MMHG | OXYGEN SATURATION: 99 % | DIASTOLIC BLOOD PRESSURE: 98 MMHG | RESPIRATION RATE: 16 BRPM | TEMPERATURE: 97.9 F | BODY MASS INDEX: 26.16 KG/M2 | HEART RATE: 78 BPM | HEIGHT: 65 IN | WEIGHT: 157 LBS

## 2017-07-11 DIAGNOSIS — M54.50 ACUTE BILATERAL LOW BACK PAIN WITHOUT SCIATICA: ICD-10-CM

## 2017-07-11 DIAGNOSIS — R29.6 MULTIPLE FALLS: ICD-10-CM

## 2017-07-11 PROCEDURE — 302449 STATCHG TRIAGE ONLY (STATISTIC)

## 2017-07-11 PROCEDURE — 99213 OFFICE O/P EST LOW 20 MIN: CPT | Performed by: NURSE PRACTITIONER

## 2017-07-11 ASSESSMENT — ENCOUNTER SYMPTOMS
VOMITING: 0
DIARRHEA: 0
ABDOMINAL PAIN: 0
CHILLS: 0
TINGLING: 0
FEVER: 0
SENSORY CHANGE: 0
FOCAL WEAKNESS: 0
BACK PAIN: 1
MYALGIAS: 0
NECK PAIN: 0
NAUSEA: 0

## 2017-07-11 ASSESSMENT — PAIN SCALES - GENERAL: PAINLEVEL_OUTOF10: 3

## 2017-07-11 NOTE — MR AVS SNAPSHOT
"        Janell Armstrongcindy   2017 5:00 PM   Office Visit   MRN: [id number]    Department:  [facility]   Dept Phone:  [phone]    Description:  Female : 1940   Provider:  LI Meng           Reason for Visit     Back Pain lower back pain and bottom of thighs      Allergies as of 2017     No Known Allergies      You were diagnosed with     Acute bilateral low back pain without sciatica   [7640449]       Multiple falls   [536395]         Vital Signs     Blood Pressure Pulse Temperature Respirations Height Weight    150/98 mmHg 78 36.6 °C (97.9 °F) 16 1.651 m (5' 5\") 71.215 kg (157 lb)    Body Mass Index Oxygen Saturation Smoking Status             26.13 kg/m2 99% Never Smoker          Basic Information     Date Of Birth Sex Race Ethnicity Preferred Language    1940 Female White Non- English      Your appointments     Aug 01, 2017  4:00 PM   Individual Therapy with Bethany Lorenzo L.C.S.W.   [facility]    [address]  [address]  [place] [address] [phone]            Sep 08, 2017  2:40 PM   Established Patient with Amairani Bello M.D.   [facility] [address]    [address] [facility]  [address]  [facility] [address] [phone]           You will be receiving a confirmation call a few days before your appointment from our automated call confirmation system.              Problem List              ICD-10-CM Priority Class Noted - Resolved    Pancreatic cyst K86.2   10/22/2012 - Present    Essential hypertension I10   2013 - Present    Hyperlipemia E78.5   2014 - Present    Insomnia G47.00   2014 - Present    Osteoporosis M81.0   2015 - Present    Seasonal asthma J45.998   2015 - Present    MEDICAL HOME    2015 - Present    Situational mixed anxiety and depressive disorder F43.23   2015 - Present    Caregiver with fatigue R53.83   2016 - Present    Acquired hypothyroidism E03.9   " 6/8/2017 - Present      Health Maintenance        Date Due Completion Dates    IMM DTaP/Tdap/Td Vaccine (1 - Tdap) 2/19/1959 ---    IMM INFLUENZA (1) 9/1/2017 10/11/2016, 10/31/2015    COLONOSCOPY 1/13/2020 1/13/2010 (Done)    Override on 1/13/2010: Done (DR Duran-every 5 years)    BONE DENSITY 5/8/2020 5/8/2015, 4/27/2011 (Done)    Override on 4/27/2011: Done (Life screening test)            Current Immunizations     13-VALENT PCV PREVNAR 10/31/2015    Influenza Vaccine Adult HD 10/11/2016, 10/31/2015    Pneumococcal polysaccharide vaccine (PPSV-23) 11/3/2006    SHINGLES VACCINE 10/30/2013      Below and/or attached are the medications your provider expects you to take. Review all of your home medications and newly ordered medications with your provider and/or pharmacist. Follow medication instructions as directed by your provider and/or pharmacist. Please keep your medication list with you and share with your provider. Update the information when medications are discontinued, doses are changed, or new medications (including over-the-counter products) are added; and carry medication information at all times in the event of emergency situations     Allergies:  No Known Allergies          Medications  Valid as of: July 11, 2017 -  6:46 PM    Generic Name Brand Name Tablet Size Instructions for use    ALPRAZolam (Tab) XANAX 0.5 MG Take 1 Tab by mouth at bedtime as needed for Sleep.        Atorvastatin Calcium (Tab) LIPITOR 10 MG Take 1 Tab by mouth every day. Indications: high cholesterol        Azelastine HCl   by Ophthalmic route every day.        Benazepril HCl (Tab) LOTENSIN 40 MG Take 1 Tab by mouth every day. Indications: High Blood Pressure        Cetirizine HCl (Tab) ZYRTEC 10 MG Take 10 mg by mouth every day.        Cholecalciferol (Cap) Vitamin D 2000 UNITS Take 1 Cap by mouth every day.        Estrogens, Conjugated (Cream) PREMARIN 0.625 MG/GM Insert 0.5 g in vagina every day.        Furosemide (Tab)  LASIX 20 MG Take 1 Tab by mouth every day. Indications: High Blood Pressure        Ipratropium Bromide (Solution) ATROVENT 0.02 % 0.2 mg by Nebulization route 4 times a day.        Levothyroxine Sodium (Tab) SYNTHROID 88 MCG Take 1 Tab by mouth Every morning on an empty stomach.        Magnesium Chloride (Tab CR) SLO- (64 MG) MG Take 535 mg by mouth every day.        MethylPREDNISolone (Tablet Therapy Pack) MEDROL DOSEPAK 4 MG Take as directed with food        Multiple Vitamins-Minerals (Tab) THERAGRAN-M  Take 1 Tab by mouth every day.        Oyster Shell (Tab) OS-CUBA 500 500 MG Take 500 mg by mouth 2 times a day, with meals.        Polyethylene Glycol 3350 (Pack) MIRALAX  Take 17 g by mouth every day.        Probiotic Product   Take  by mouth every day.          Sertraline HCl (Tab) ZOLOFT 100 MG Take 2 Tabs by mouth every day.        Zolpidem Tartrate (Tab) AMBIEN 10 MG Take 1 Tab by mouth at bedtime as needed for Sleep.        .                 Medicines prescribed today were sent to:     [facility]/PHARMACY #[id number] - [place], [place] - [address]    [address] [place] [address]    Phone: [phone] Fax: [phone]    Open 24 Hours?: No      Medication refill instructions:       If your prescription bottle indicates you have medication refills left, it is not necessary to call your provider’s office. Please contact your pharmacy and they will refill your medication.    If your prescription bottle indicates you do not have any refills left, you may request refills at any time through one of the following ways: The online [employer] system (except Urgent Care), by calling your provider’s office, or by asking your pharmacy to contact your provider’s office with a refill request. Medication refills are processed only during regular business hours and may not be available until the next business day. Your provider may request additional information or to have a follow-up visit with you prior to refilling your  medication.   *Please Note: Medication refills are assigned a new Rx number when refilled electronically. Your pharmacy may indicate that no refills were authorized even though a new prescription for the same medication is available at the pharmacy. Please request the medicine by name with the pharmacy before contacting your provider for a refill.        Other Notes About Your Plan     Patient is enrolled in [facility] with Dr. Gracia.           [employer] Access Code: Activation code not generated  Current [employer] Status: Active

## 2017-07-11 NOTE — ED NOTES
"Pt approaching Triage RN asking how long wait is. Pt states \"I can't wait much longer because my grandson is watching my  who has dementia and I need to get back.\" Pt educated on triage system and acuity. Verbalizes understanding.   Apologized for the wait and educated to inform us of any changes in condition.   "

## 2017-07-11 NOTE — ED NOTES
Pt bib self with c/o of two GLF a week ago. Pt states she landed on her tailbone and hit the back of her head. Denies LOC. Seen at  and advised to come to ED for incontinent issues. Pt states she's been having incontinent issues since the fall.

## 2017-07-11 NOTE — ED NOTES
"Pt approaching Tera RN stating \"It's going to take days. I'm going.\"  Pt educated to return to the ED for any concerns.   "

## 2017-07-12 ENCOUNTER — HOSPITAL ENCOUNTER (EMERGENCY)
Facility: MEDICAL CENTER | Age: 77
End: 2017-07-12
Attending: EMERGENCY MEDICINE
Payer: MEDICARE

## 2017-07-12 ENCOUNTER — APPOINTMENT (OUTPATIENT)
Dept: RADIOLOGY | Facility: MEDICAL CENTER | Age: 77
End: 2017-07-12
Attending: EMERGENCY MEDICINE
Payer: MEDICARE

## 2017-07-12 VITALS
SYSTOLIC BLOOD PRESSURE: 169 MMHG | OXYGEN SATURATION: 96 % | HEART RATE: 61 BPM | TEMPERATURE: 97.3 F | BODY MASS INDEX: 26.74 KG/M2 | DIASTOLIC BLOOD PRESSURE: 94 MMHG | WEIGHT: 160.72 LBS | RESPIRATION RATE: 16 BRPM

## 2017-07-12 DIAGNOSIS — S32.10XA CLOSED FRACTURE OF SACRUM, UNSPECIFIED PORTION OF SACRUM, INITIAL ENCOUNTER (HCC): ICD-10-CM

## 2017-07-12 PROCEDURE — A9270 NON-COVERED ITEM OR SERVICE: HCPCS | Performed by: EMERGENCY MEDICINE

## 2017-07-12 PROCEDURE — 700102 HCHG RX REV CODE 250 W/ 637 OVERRIDE(OP): Performed by: EMERGENCY MEDICINE

## 2017-07-12 PROCEDURE — 72148 MRI LUMBAR SPINE W/O DYE: CPT

## 2017-07-12 PROCEDURE — 99284 EMERGENCY DEPT VISIT MOD MDM: CPT

## 2017-07-12 RX ORDER — HYDROCODONE BITARTRATE AND ACETAMINOPHEN 5; 325 MG/1; MG/1
1 TABLET ORAL ONCE
Status: COMPLETED | OUTPATIENT
Start: 2017-07-12 | End: 2017-07-12

## 2017-07-12 RX ORDER — SERTRALINE HYDROCHLORIDE 100 MG/1
150 TABLET, FILM COATED ORAL DAILY
Status: SHIPPED | COMMUNITY
End: 2018-01-09

## 2017-07-12 RX ORDER — AZELASTINE HYDROCHLORIDE 0.5 MG/ML
1 SOLUTION/ DROPS OPHTHALMIC DAILY
COMMUNITY

## 2017-07-12 RX ORDER — IBUPROFEN 600 MG/1
600 TABLET ORAL ONCE
Status: DISCONTINUED | OUTPATIENT
Start: 2017-07-12 | End: 2017-07-12 | Stop reason: HOSPADM

## 2017-07-12 RX ORDER — HYDROCODONE BITARTRATE AND ACETAMINOPHEN 7.5; 325 MG/1; MG/1
1-2 TABLET ORAL EVERY 6 HOURS PRN
Qty: 20 TAB | Refills: 0 | Status: SHIPPED | OUTPATIENT
Start: 2017-07-12 | End: 2017-07-17 | Stop reason: SDUPTHER

## 2017-07-12 RX ADMIN — HYDROCODONE BITARTRATE AND ACETAMINOPHEN 1 TABLET: 5; 325 TABLET ORAL at 12:15

## 2017-07-12 ASSESSMENT — PAIN SCALES - GENERAL: PAINLEVEL_OUTOF10: 9

## 2017-07-12 NOTE — ED AVS SNAPSHOT
7/12/2017    Janell Tinsley  [address] [place] [address]    Dear Janell:    [facility] wants to ensure your discharge home is safe and you or your loved ones have had all of your questions answered regarding your care after you leave the hospital.    Below is a list of resources and contact information should you have any questions regarding your hospital stay, follow-up instructions, or active medical symptoms.    Questions or Concerns Regarding… Contact   Medical Questions Related to Your Discharge  (7 days a week, 8am-5pm) Contact a Nurse Care Coordinator   [phone]   Medical Questions Not Related to Your Discharge  (24 hours a day / 7 days a week)  Contact the Nurse Health Line   [phone]    Medications or Discharge Instructions Refer to your discharge packet   or contact your [facility] Primary Care Provider   [phone]   Follow-up Appointment(s) Schedule your appointment via [employer]   or contact Scheduling [phone]   Billing Review your statement via [employer]  or contact Billing [phone]   Medical Records Review your records via [employer]   or contact Medical Records [phone]     You may receive a telephone call within two days of discharge. This call is to make certain you understand your discharge instructions and have the opportunity to have any questions answered. You can also easily access your medical information, test results and upcoming appointments via the [employer] free online health management tool. You can learn more and sign up at [employer]/3 day [employer]s. For assistance setting up your [employer] account, please call [phone].    Once again, we want to ensure your discharge home is safe and that you have a clear understanding of any next steps in your care. If you have any questions or concerns, please do not hesitate to contact us, we are here for you. Thank you for choosing [facility] for your healthcare needs.    Sincerely,    Your [facility] Healthcare Team

## 2017-07-12 NOTE — ED PROVIDER NOTES
"ED Provider Note    CHIEF COMPLAINT  Chief Complaint   Patient presents with   • Back Pain     after fall 2 weeks ago, was seen at  and told her lumbar XRAY was \"inconclusive but I may need emergency surgery\"       HPI  Janell Tinsley is a 77 y.o. female who presents with a history of chronic low back pain, she is followed by hanh Maria and receives intermittent spinal injections. She reports that one week ago her pain got worse and she fell 3 times. He presented to urgent care 2 days ago and had a lumbar spine x-ray that did not show abnormality. However, there was concern over numbness behind her bilateral thighs and incontinence of urine. She was unable to make it to the ER until today. The patient reports she is able to walk she is able to get in and out of bed but that causes pain. She takes hydrocodone for pain. She does have new incontinence of urine.    REVIEW OF SYSTEMS  See HPI for further details. All other systems are negative.     PAST MEDICAL HISTORY   has a past medical history of Thyroid activity decreased; CAD (coronary artery disease); Hypertension; ASTHMA; Infectious disease; Hyperlipemia (7/25/2014); Insomnia (7/25/2014); Pain of left heel (1/13/2015); Back pain (7/25/2014); Arthritis (7/25/2014); and History of colonic diverticulitis (1/30/2013).    SOCIAL HISTORY  Social History     Social History Main Topics   • Smoking status: Never Smoker    • Smokeless tobacco: Never Used   • Alcohol Use: No   • Drug Use: No   • Sexual Activity:     Partners: Male       SURGICAL HISTORY   has past surgical history that includes other abdominal surgery; gyn surgery; other orthopedic surgery; other; knee arthroscopy (8/5/2010); medial meniscectomy (8/5/2010); meniscectomy (8/5/2010); other (1970); gastroscopy with biopsy (10/22/2012); egd with asp/bx (10/22/2012); and abdominal hysterectomy total.    CURRENT MEDICATIONS  Home Medications     Reviewed by Fatou Crooks (Pharmacy Tech) on " 07/12/17 at 1232  Med List Status: Complete    Medication Last Dose Status    alprazolam (XANAX) 0.5 MG Tab 7/11/2017 Active    atorvastatin (LIPITOR) 10 MG Tab 7/11/2017 Active    azelastine (OPTIVAR) 0.05 % ophthalmic solution 7/11/2017 Active    benazepril (LOTENSIN) 40 MG tablet 7/11/2017 Active    calcium carbonate (CALCIUM CARBONATE) 500 MG Tab 7/12/2017 Active    cetirizine (ZYRTEC) 10 MG TABS 7/11/2017 Active    Cholecalciferol (VITAMIN D) 2000 UNITS CAPS 7/12/2017 Active    furosemide (LASIX) 20 MG Tab 3 DAYS Active    ipratropium (ATROVENT) 0.02 % SOLN PRN Active    magnesium chloride SR (SLO-MAG) 535 (64 MG) MG Tab CR 7/12/2017 Active    MethylPREDNISolone (MEDROL DOSEPAK) 4 MG Tablet Therapy Pack 7/12/2017 Active    polyethylene glycol/lytes (MIRALAX) PACK 7/12/2017 Active    Probiotic Product (PROBIOTIC FORMULA PO) 7/12/2017 Active    sertraline (ZOLOFT) 100 MG Tab 7/12/2017 Active    SYNTHROID 88 MCG Tab 7/12/2017 Active    therapeutic multivitamin-minerals (THERAGRAN-M) TABS 7/12/2017 Active    zolpidem (AMBIEN) 10 MG Tab 7/11/2017 Active                ALLERGIES  No Known Allergies    PHYSICAL EXAM  VITAL SIGNS: /94 mmHg  Pulse 61  Temp(Src) 36.3 °C (97.3 °F)  Resp 16  Wt 72.9 kg (160 lb 11.5 oz)  SpO2 96% @ENRIQUE[[id number]::@   Pulse ox interpretation: I interpret this pulse ox as normal.  Constitutional: Alert in no apparent distress.  HENT: No signs of trauma, Bilateral external ears normal, Nose normal.   Eyes: Pupils are equal and reactive, Conjunctiva normal, Non-icteric.   Neck: Normal range of motion, No tenderness, Supple, No stridor.   Lymphatic: No lymphadenopathy noted.   Cardiovascular: Regular rate and rhythm, no murmurs.   Thorax & Lungs: Normal breath sounds, No respiratory distress, No wheezing, No chest tenderness.   Abdomen: Bowel sounds normal, Soft, No tenderness, No masses, No pulsatile masses. No peritoneal signs.  Skin: Warm, Dry, No erythema, No rash.   Back: No bony  tenderness, No CVA tenderness. The patient has tenderness paraspinally in the sacral area.  Extremities: Intact distal pulses, No edema, No tenderness, No cyanosis.  Musculoskeletal: Good range of motion in all major joints. No tenderness to palpation or major deformities noted.   Neurologic: Alert , Normal motor function, Normal sensory function, No focal deficits noted. The patient is able to walk with a cane, she has good strength in both lower extremities, 5 over 5 both dorsi and plantarflexion.  Psychiatric: Affect normal, Judgment normal, Mood normal.       DIAGNOSTIC STUDIES / PROCEDURES        RADIOLOGY  MR-LUMBAR SPINE-W/O   Final Result      1.  Acute or recent subacute sacral insufficiency fracture at the S2-S3 segments.   2.  Multilevel degenerative disc disease and spondylotic changes most notable for L3-L4 mild-moderate central stenosis.   3.  Additional degenerative and spondylotic changes as detailed for each level above in the body of report.              COURSE & MEDICAL DECISION MAKING  Pertinent Labs & Imaging studies reviewed. (See chart for details)    Differential diagnosis: Spinal cord compression, musculoskeletal low back pain      An MRI was ordered to evaluate the patient for her incontinence for possible surgical emergency.    4:03 PM MRI shows sacral insufficiency fracture. I spoke with the neurosurgeon, he would like the patient to take NSAIDs if able to and follow-up. He reports that the injury the patient has is not a surgical emergency. He reviewed the patient's MRI. We discovered that she is acting not his patient and she will follow-up with her doctor at [facility]. She is taking MiraLAX to avoid constipation, I will prescribe her hydrocodone. She will obtain a copy of the results of her MRI to follow up with [facility].     I reviewed prescription monitoring program for patient's narcotic use before prescribing a scheduled drug.The patient will not drink alcohol  nor drive with prescribed medications.The patient will return for new or worsening symptoms and is stable at the time of discharge. The patient is discharged and walking without assistance with her cane.    The patient is referred to a primary physician for blood pressure management, diabetic screening, and for all other preventative health concerns.    DISPOSITION:  Patient will be discharged home in stable condition.    FOLLOW UP:  [facility], Emergency Dept  [address] [facility] [id number] [phone]    follow up ER [facility] for worsening symptoms    Artem Mcknight M.D.  [address] [facility] [address]  [address]  [facility] [id number] [phone]      call for follow up, bring MRI results and disk      OUTPATIENT MEDICATIONS:  New Prescriptions    HYDROCODONE-ACETAMINOPHEN (NORCO) 7.5-325 MG PER TABLET    Take 1-2 Tabs by mouth every 6 hours as needed.           The patient will not drink alcohol nor drive with prescribed medications. The patient will return for worsening symptoms and is stable at the time of discharge. The patient verbalizes understanding and will comply.    FINAL IMPRESSION  1. Acute sacral insufficiency fracture  2.   3.         Electronically signed by: Temo Min, 7/12/2017 12:09 PM

## 2017-07-12 NOTE — ED NOTES
D/c pt home, rx  And information in regards to mri hard copy given . Pt aware of f/u instructions , aware to return for any changes or concerns. No further questions upon d/c home from ed

## 2017-07-12 NOTE — ED AVS SNAPSHOT
[email or web address] Access Code: Activation code not generated  Current [email or web address] Status: Active    [employer]  A secure, online tool to manage your health information     [employer]’s [email or web address]® is a secure, online tool that connects you to your personalized health information from the privacy of your home -- day or night - making it very easy for you to manage your healthcare. Once the activation process is completed, you can even access your medical information using the [email or web address] emily, which is available for free in the Apple Emily store or Google Play store.     [email or web address] provides the following levels of access (as shown below):   My Chart Features   [facility] Primary Care Doctor [facility]  Specialists [facility]  Urgent  Care Non-[facility]  Primary Care  Doctor   Email your healthcare team securely and privately 24/7 X X X X   Manage appointments: schedule your next appointment; view details of past/upcoming appointments X      Request prescription refills. X      View recent personal medical records, including lab and immunizations X X X X   View health record, including health history, allergies, medications X X X X   Read reports about your outpatient visits, procedures, consult and ER notes X X X X   See your discharge summary, which is a recap of your hospital and/or ER visit that includes your diagnosis, lab results, and care plan. X X       How to register for [email or web address]:  1. Go to  [email or web address].  2. Click on the Sign Up Now box, which takes you to the New Member Sign Up page. You will need to provide the following information:  a. Enter your [email or web address] Access Code exactly as it appears at the top of this page. (You will not need to use this code after you’ve completed the sign-up process. If you do not sign up before the expiration date, you must request a new code.)   b. Enter your date of birth.   c. Enter your home email address.   d. Click Submit, and follow the next screen’s instructions.  3. Create a [email or web address] ID. This will  be your [employer] login ID and cannot be changed, so think of one that is secure and easy to remember.  4. Create a [employer] password. You can change your password at any time.  5. Enter your Password Reset Question and Answer. This can be used at a later time if you forget your password.   6. Enter your e-mail address. This allows you to receive e-mail notifications when new information is available in [employer].  7. Click Sign Up. You can now view your health information.    For assistance activating your [employer] account, call [phone]

## 2017-07-12 NOTE — DISCHARGE INSTRUCTIONS
Tailbone Injury  The tailbone (coccyx) is the small bone at the lower end of the spine. A tailbone injury may involve stretched ligaments, bruising, or a broken bone (fracture). Tailbone injuries can be painful, and some may take a long time to heal.  CAUSES  This condition is often caused by falling and landing on the tailbone. Other causes include:  · Repeated strain or friction from actions such as rowing and bicycling.  · Childbirth.  In some cases, the cause may not be known.  RISK FACTORS  This condition is more common in women than in men.  SYMPTOMS  Symptoms of this condition include:  · Pain in the lower back, especially when sitting.  · Pain or difficulty when standing up from a sitting position.  · Bruising in the tailbone area.  · Painful bowel movements.  · In women, pain during intercourse.  DIAGNOSIS  This condition may be diagnosed based on your symptoms and a physical exam. X-rays may be taken if a fracture is suspected. You may also have other tests, such as a CT scan or MRI.  TREATMENT  This condition may be treated with medicines to help relieve your pain. Most tailbone injuries heal on their own in 4-6 weeks. However, recovery time may be longer if the injury involves a fracture.  HOME CARE INSTRUCTIONS  · Take medicines only as directed by your health care provider.  · If directed, apply ice to the injured area:  ¨ Put ice in a plastic bag.  ¨ Place a towel between your skin and the bag.  ¨ Leave the ice on for 20 minutes, 2-3 times per day for the first 1-2 days.  · Sit on a large, rubber or inflated ring or cushion to ease your pain. Lean forward when you are sitting to help decrease discomfort.  · Avoid sitting for long periods of time.  · Increase your activity as the pain allows. Perform any exercises that are recommended by your health care provider or physical therapist.  · If you have pain during bowel movements, use stool softeners as directed by your health care provider.  · Eat a  diet that includes plenty of fiber to help prevent constipation.  · Keep all follow-up visits as directed by your health care provider. This is important.  PREVENTION  Wear appropriate padding and sports gear when bicycling and rowing. This can help to prevent developing an injury that is caused by repeated strain or friction.  SEEK MEDICAL CARE IF:  · Your pain becomes worse.  · Your bowel movements cause a great deal of discomfort.  · You are unable to have a bowel movement.  · You have uncontrolled urine loss (urinary incontinence).  · You have a fever.     This information is not intended to replace advice given to you by your health care provider. Make sure you discuss any questions you have with your health care provider.     Document Released: 12/15/2001 Document Revised: 05/03/2016 Document Reviewed: 12/14/2015  [facility] Interactive Patient Education ©2016 [employer] Inc.

## 2017-07-12 NOTE — ED NOTES
1530 All results back, chart up for MD for re evaluation. poc update given to pt. No further questions at this time. Further orders and dispo pending  1533 md at bedside

## 2017-07-12 NOTE — ED NOTES
1450 - Rounded on patient. Patient states hungry and pain unchanged.   1515 - Report to Pramod CARTER.

## 2017-07-12 NOTE — ED NOTES
MRI form faxed. Patient up to restroom with cane.    given sandwich. Per patient -  has dementia and cannot walk to cafeteria and back.

## 2017-07-12 NOTE — ED NOTES
"Chief Complaint   Patient presents with   • Back Pain     after fall 2 weeks ago, was seen at  and told her lumbar XRAY was \"inconclusive but I may need emergency surgery\"       "

## 2017-07-12 NOTE — PROGRESS NOTES
"Subjective:      Janell Tinsley is a 77 y.o. female who presents with Back Pain            HPI Comments: Pt was seen here yesterday for back pain after falling several times. Pt states the provider told her to go to the ED for an emergency MRI. The pt could not go yesterday because she is the primary provider for her demented . The pt went to the ED today waited for 2 hours and left. She came here requesting that we order a MRI for her back pain.      Medications, Allergies and Prior Medical Hx reviewed and updated in [facility].with patient/family today     Discussed with Dr Shearer who saw pt yesterday. She states the pt was instructed to go the the ED for worsening sx and she may need an MRI. She states the pt has a spine physician for her back pain and she should follow-up with them as soon as possible.         Review of Systems   Constitutional: Negative for fever and chills.   Gastrointestinal: Negative for nausea, vomiting, abdominal pain and diarrhea.   Genitourinary: Negative for dysuria, urgency and frequency.   Musculoskeletal: Positive for back pain. Negative for myalgias, joint pain and neck pain.   Neurological: Negative for tingling, sensory change and focal weakness.          Objective:     /98 mmHg  Pulse 78  Temp(Src) 36.6 °C (97.9 °F)  Resp 16  Ht 1.651 m (5' 5\")  Wt 71.215 kg (157 lb)  BMI 26.13 kg/m2  SpO2 99%     Physical Exam   Constitutional: She appears well-developed and well-nourished. No distress.   HENT:   Head: Normocephalic and atraumatic.   Eyes: Conjunctivae are normal. Pupils are equal, round, and reactive to light.   Neck: Neck supple.   Cardiovascular: Normal rate and regular rhythm.    Pulmonary/Chest: Effort normal and breath sounds normal. No respiratory distress.   Musculoskeletal:        Lumbar back: She exhibits decreased range of motion, tenderness, bony tenderness and pain. She exhibits no swelling, no edema, no deformity and no laceration.        " Back:     pain with tenderness at midline low back with paraspinal ttp, flex ext of feet strong and equal, sensation grossly intact, patellar reflexes +2 bilat. Abd soft non tender, no pulsating masses,        Neurological: She is alert.   Awake, alert, answering questions appropriately, moving all extremeties   Skin: Skin is warm and dry. No erythema.   Psychiatric: She has a normal mood and affect. Her behavior is normal.   Vitals reviewed.              Assessment/Plan:     1. Acute bilateral low back pain without sciatica     2. Multiple falls         I discussed with the patient my conversation with Dr Shearer and repeated Dr Shearer's discharge instructions concerning worsening sx. I explained to the pt that I could not order an emergency MRI from urgent care, the MRI order would have to be pre approved and then scheduled which would take several days at minimum. If she had worsening sx the she would need to go to the ED.  The pt was also instructed to follow-up with her spine physician. The pt should also complete the medication Dr Shearer prescribed.  The pt verbalizes understanding.

## 2017-07-14 ENCOUNTER — PATIENT MESSAGE (OUTPATIENT)
Dept: MEDICAL GROUP | Facility: MEDICAL CENTER | Age: 77
End: 2017-07-14

## 2017-07-14 NOTE — TELEPHONE ENCOUNTER
Called pt. She reports her main concern is that her head is sweating and her hair is drenched, has been going on for about 1.5months. No changes to her meds, environment.  tsh was normal last month.  Discussed likely not dangerous, possibly stress induced.  She's quite stressed now as her  seems to be worse lately, attributes his worsening status to her recent ER and UC visits.  She's talking to family to see if she can get some help now    Also reports that her back pain isn't well controlled on one norco every 6 hrs.  Has increased to two tabs.  Is also taking aleve and prednisone.  She doesn't find much relief from the prednisone.  advised her to stop the prednisone, continue with aleve and norco, will try norco every 4hrs prn. She will call and let me know when refills are needed.

## 2017-07-14 NOTE — TELEPHONE ENCOUNTER
From: Janell Tinsley  To: Amairani Bello M.D.  Sent: 7/14/2017 12:16 PM PDT  Subject: Test Result Question    Roly Ramirez I am sure you have received notice from the hospital that I broke S-2 and S-3. I need to speak with you PLEASE call me Michelle Tinsley

## 2017-07-17 RX ORDER — HYDROCODONE BITARTRATE AND ACETAMINOPHEN 7.5; 325 MG/1; MG/1
1-2 TABLET ORAL EVERY 4 HOURS PRN
Qty: 180 TAB | Refills: 0 | Status: SHIPPED | OUTPATIENT
Start: 2017-07-17 | End: 2017-08-08

## 2017-07-18 ENCOUNTER — TELEPHONE (OUTPATIENT)
Dept: MEDICAL GROUP | Facility: MEDICAL CENTER | Age: 77
End: 2017-07-18

## 2017-07-18 NOTE — TELEPHONE ENCOUNTER
Called pt. Advised her to not take ambien and xanax when taking the norco given risk of respiratory depression and death

## 2017-07-18 NOTE — TELEPHONE ENCOUNTER
VOICEMAIL  1. Caller Name: Janell                      Call Back Number: [phone] (home)       2. Message: patient left vm wanting to know if its ok to take half tab of ambien since it says with pain med you are not suppose to take Ambien    3. Patient approves office to leave a detailed voicemail/MyChart message: yes

## 2017-08-01 ENCOUNTER — APPOINTMENT (OUTPATIENT)
Dept: BEHAVIORAL HEALTH | Facility: PHYSICIAN GROUP | Age: 77
End: 2017-08-01
Payer: MEDICARE

## 2017-08-08 ENCOUNTER — OFFICE VISIT (OUTPATIENT)
Dept: MEDICAL GROUP | Facility: MEDICAL CENTER | Age: 77
End: 2017-08-08
Payer: MEDICARE

## 2017-08-08 ENCOUNTER — HOSPITAL ENCOUNTER (OUTPATIENT)
Dept: LAB | Facility: MEDICAL CENTER | Age: 77
End: 2017-08-08
Attending: FAMILY MEDICINE
Payer: MEDICARE

## 2017-08-08 VITALS
WEIGHT: 154 LBS | SYSTOLIC BLOOD PRESSURE: 136 MMHG | DIASTOLIC BLOOD PRESSURE: 78 MMHG | HEART RATE: 74 BPM | OXYGEN SATURATION: 93 % | HEIGHT: 65 IN | RESPIRATION RATE: 14 BRPM | BODY MASS INDEX: 25.66 KG/M2

## 2017-08-08 DIAGNOSIS — R10.31 RLQ ABDOMINAL PAIN: ICD-10-CM

## 2017-08-08 DIAGNOSIS — R11.0 NAUSEA: ICD-10-CM

## 2017-08-08 DIAGNOSIS — R14.0 BLOATING: ICD-10-CM

## 2017-08-08 DIAGNOSIS — R10.11 RUQ PAIN: ICD-10-CM

## 2017-08-08 DIAGNOSIS — S32.10XD CLOSED FRACTURE OF SACRUM WITH ROUTINE HEALING, UNSPECIFIED PORTION OF SACRUM, SUBSEQUENT ENCOUNTER: ICD-10-CM

## 2017-08-08 LAB
ALBUMIN SERPL BCP-MCNC: 4.4 G/DL (ref 3.2–4.9)
ALBUMIN/GLOB SERPL: 1.6 G/DL
ALP SERPL-CCNC: 150 U/L (ref 30–99)
ALT SERPL-CCNC: 13 U/L (ref 2–50)
ANION GAP SERPL CALC-SCNC: 9 MMOL/L (ref 0–11.9)
APPEARANCE UR: ABNORMAL
AST SERPL-CCNC: 20 U/L (ref 12–45)
BACTERIA #/AREA URNS HPF: ABNORMAL /HPF
BILIRUB SERPL-MCNC: 0.7 MG/DL (ref 0.1–1.5)
BILIRUB UR QL STRIP.AUTO: NEGATIVE
BUN SERPL-MCNC: 9 MG/DL (ref 8–22)
CALCIUM SERPL-MCNC: 9.9 MG/DL (ref 8.5–10.5)
CAOX CRY #/AREA URNS HPF: ABNORMAL /HPF
CHLORIDE SERPL-SCNC: 104 MMOL/L (ref 96–112)
CO2 SERPL-SCNC: 26 MMOL/L (ref 20–33)
COLOR UR: YELLOW
CREAT SERPL-MCNC: 0.67 MG/DL (ref 0.5–1.4)
CULTURE IF INDICATED INDCX: YES UA CULTURE
EPI CELLS #/AREA URNS HPF: ABNORMAL /HPF
ERYTHROCYTE [DISTWIDTH] IN BLOOD BY AUTOMATED COUNT: 42 FL (ref 35.9–50)
GFR SERPL CREATININE-BSD FRML MDRD: >60 ML/MIN/1.73 M 2
GLOBULIN SER CALC-MCNC: 2.7 G/DL (ref 1.9–3.5)
GLUCOSE SERPL-MCNC: 94 MG/DL (ref 65–99)
GLUCOSE UR STRIP.AUTO-MCNC: NEGATIVE MG/DL
HCT VFR BLD AUTO: 41.8 % (ref 37–47)
HGB BLD-MCNC: 13.9 G/DL (ref 12–16)
KETONES UR STRIP.AUTO-MCNC: 15 MG/DL
LEUKOCYTE ESTERASE UR QL STRIP.AUTO: ABNORMAL
LIPASE SERPL-CCNC: 28 U/L (ref 11–82)
MCH RBC QN AUTO: 28.5 PG (ref 27–33)
MCHC RBC AUTO-ENTMCNC: 33.3 G/DL (ref 33.6–35)
MCV RBC AUTO: 85.7 FL (ref 81.4–97.8)
MICRO URNS: ABNORMAL
NITRITE UR QL STRIP.AUTO: NEGATIVE
PH UR STRIP.AUTO: 5.5 [PH]
PLATELET # BLD AUTO: 306 K/UL (ref 164–446)
PMV BLD AUTO: 9.4 FL (ref 9–12.9)
POTASSIUM SERPL-SCNC: 3.2 MMOL/L (ref 3.6–5.5)
PROT SERPL-MCNC: 7.1 G/DL (ref 6–8.2)
PROT UR QL STRIP: NEGATIVE MG/DL
RBC # BLD AUTO: 4.88 M/UL (ref 4.2–5.4)
RBC # URNS HPF: ABNORMAL /HPF
RBC UR QL AUTO: NEGATIVE
SODIUM SERPL-SCNC: 139 MMOL/L (ref 135–145)
SP GR UR REFRACTOMETRY: >1.035
TSH SERPL DL<=0.005 MIU/L-ACNC: 2.64 UIU/ML (ref 0.3–3.7)
UROBILINOGEN UR STRIP.AUTO-MCNC: 1 MG/DL
WBC # BLD AUTO: 10 K/UL (ref 4.8–10.8)
WBC #/AREA URNS HPF: ABNORMAL /HPF

## 2017-08-08 PROCEDURE — 84443 ASSAY THYROID STIM HORMONE: CPT | Mod: GA

## 2017-08-08 PROCEDURE — 85027 COMPLETE CBC AUTOMATED: CPT

## 2017-08-08 PROCEDURE — 80053 COMPREHEN METABOLIC PANEL: CPT

## 2017-08-08 PROCEDURE — 83690 ASSAY OF LIPASE: CPT

## 2017-08-08 PROCEDURE — 81001 URINALYSIS AUTO W/SCOPE: CPT

## 2017-08-08 PROCEDURE — 36415 COLL VENOUS BLD VENIPUNCTURE: CPT

## 2017-08-08 PROCEDURE — 87086 URINE CULTURE/COLONY COUNT: CPT

## 2017-08-08 PROCEDURE — 99214 OFFICE O/P EST MOD 30 MIN: CPT | Performed by: FAMILY MEDICINE

## 2017-08-08 RX ORDER — OMEPRAZOLE 20 MG/1
20 CAPSULE, DELAYED RELEASE ORAL 2 TIMES DAILY
Qty: 60 CAP | Refills: 1 | Status: SHIPPED | OUTPATIENT
Start: 2017-08-08 | End: 2017-08-21

## 2017-08-08 RX ORDER — OXYCODONE HYDROCHLORIDE AND ACETAMINOPHEN 5; 325 MG/1; MG/1
.5-1 TABLET ORAL EVERY 8 HOURS PRN
Qty: 60 TAB | Refills: 0 | Status: SHIPPED | OUTPATIENT
Start: 2017-08-08 | End: 2017-09-05 | Stop reason: SDUPTHER

## 2017-08-08 RX ORDER — ONDANSETRON 4 MG/1
4 TABLET, FILM COATED ORAL EVERY 4 HOURS PRN
Qty: 30 TAB | Refills: 1 | Status: SHIPPED | OUTPATIENT
Start: 2017-08-08

## 2017-08-08 NOTE — PROGRESS NOTES
cc: Nausea    Subjective:     Janell Tinsley is a 77 y.o. female presenting with her  and neighbor with nausea and right-sided abdominal pain. She reports the nausea started about a week ago. Associated with right-sided abdominal pain, however, her  fell on her on the right side a week and a half ago, is not sure if the pain is due to that. She notes decreased appetite, is staying well-hydrated by eating very little. Has been having large bowel movements and abdominal bloating. She has lost a couple pounds. She has a history of diverticulitis. No particular triggers of symptoms. Denies any fevers, chest pain, shortness of breath, diarrhea, constipation, blood in the urine or stools, urinary symptoms, vaginal bleeding, rashes, new foods, recent travel, sick contacts. Has tried nothing. She does have a sacral fracture, was taking Aleve several weeks ago, hasn't been taking it since. She was prescribed a Medrol dose pack, is not sure if she took that. She has seen her spine doctor, plan for an epidural in the near future.    Review of systems:  See above.      Current outpatient prescriptions:   •  omeprazole (PRILOSEC) 20 MG delayed-release capsule, Take 1 Cap by mouth 2 times a day., Disp: 60 Cap, Rfl: 1  •  Diclofenac Sodium (VOLTAREN) 1 % Gel, Apply 4 g of 1% gel to affected area 4 times daily as needed (maximum: 16 g per joint per day) for pain, Disp: 100 g, Rfl: 3  •  ondansetron (ZOFRAN) 4 MG Tab tablet, Take 1 Tab by mouth every four hours as needed for Nausea/Vomiting., Disp: 30 Tab, Rfl: 1  •  oxycodone-acetaminophen (PERCOCET) 5-325 MG Tab, Take 0.5-1 Tabs by mouth every 8 hours as needed for Severe Pain., Disp: 60 Tab, Rfl: 0  •  sertraline (ZOLOFT) 100 MG Tab, Take 150 mg by mouth every day., Disp: , Rfl:   •  azelastine (OPTIVAR) 0.05 % ophthalmic solution, Place 1 Drop in both eyes every day., Disp: , Rfl:   •  benazepril (LOTENSIN) 40 MG tablet, Take 1 Tab by mouth every day.  "Indications: High Blood Pressure, Disp: 90 Tab, Rfl: 3  •  SYNTHROID 88 MCG Tab, Take 1 Tab by mouth Every morning on an empty stomach., Disp: 90 Tab, Rfl: 3  •  furosemide (LASIX) 20 MG Tab, Take 1 Tab by mouth every day. Indications: High Blood Pressure, Disp: 90 Tab, Rfl: 2  •  atorvastatin (LIPITOR) 10 MG Tab, Take 1 Tab by mouth every day. Indications: high cholesterol, Disp: 90 Tab, Rfl: 3  •  alprazolam (XANAX) 0.5 MG Tab, Take 1 Tab by mouth at bedtime as needed for Sleep. (Patient taking differently: Take 0.25 mg by mouth at bedtime as needed for Sleep.), Disp: 90 Tab, Rfl: 1  •  zolpidem (AMBIEN) 10 MG Tab, Take 1 Tab by mouth at bedtime as needed for Sleep. (Patient taking differently: Take 5 mg by mouth at bedtime as needed for Sleep.), Disp: 90 Tab, Rfl: 3  •  calcium carbonate (CALCIUM CARBONATE) 500 MG Tab, Take 500 mg by mouth 2 times a day, with meals., Disp: , Rfl:   •  magnesium chloride SR (SLO-MAG) 535 (64 MG) MG Tab CR, Take 535 mg by mouth every day., Disp: , Rfl:   •  polyethylene glycol/lytes (MIRALAX) PACK, Take 17 g by mouth every day., Disp: , Rfl:   •  therapeutic multivitamin-minerals (THERAGRAN-M) TABS, Take 1 Tab by mouth every day., Disp: , Rfl:   •  Cholecalciferol (VITAMIN D) 2000 UNITS CAPS, Take 1 Cap by mouth every day., Disp: , Rfl:   •  ipratropium (ATROVENT) 0.02 % SOLN, 0.2 mg by Nebulization route 4 times a day., Disp: , Rfl:   •  Probiotic Product (PROBIOTIC FORMULA PO), Take 1 Tab by mouth every day., Disp: , Rfl:   •  cetirizine (ZYRTEC) 10 MG TABS, Take 10 mg by mouth every day., Disp: , Rfl:     No Known Allergies    Past Medical History   Diagnosis Date   • Thyroid activity decreased      medicated   • CAD (coronary artery disease)      medicated   • Hypertension      medicated   • ASTHMA      \"seasonal\"   • Infectious disease      MRSA   • Hyperlipemia 7/25/2014   • Insomnia 7/25/2014   • Pain of left heel 1/13/2015   • Back pain 7/25/2014     S/p epidural L4 " "radiculopathy Completed PT    • Arthritis 7/25/2014     S/P bilateral meniscectomies R knee pain > L    • History of colonic diverticulitis 1/30/2013     2 episodes in past year 2013-14      Past Surgical History   Procedure Laterality Date   • Other abdominal surgery       appy   • Gyn surgery       hysterectomy   • Other orthopedic surgery       right shoulder rotater cuff   • Other       tonsillectomy   • Knee arthroscopy  8/5/2010     Performed by RINA MONTESINOS at SURGERY SAME DAY [facility] ORS   • Medial meniscectomy  8/5/2010     Performed by RINA MONTESINOS at SURGERY SAME DAY [facility] ORS   • Meniscectomy  8/5/2010     Performed by RINA MONTESINOS at SURGERY SAME DAY [facility] ORS   • Other  1970     laparotomy removed fibroid   • Gastroscopy with biopsy  10/22/2012     Performed by Santosh Medina M.D. at SURGERY [facility]   • Egd with asp/bx  10/22/2012     Performed by Santosh Medina M.D. at SURGERY [facility]   • Abdominal hysterectomy total       Family History   Problem Relation Age of Onset   • Diabetes     • Hypertension     • Stroke     • Cancer     • Arthritis Mother    • Cancer Mother      cervical   • Alcohol abuse Mother    • Heart Disease Father    • Alcohol abuse Step-Father      Social History     Social History   • Marital Status:      Spouse Name: N/A   • Number of Children: N/A   • Years of Education: N/A     Occupational History   • Not on file.     Social History Main Topics   • Smoking status: Never Smoker    • Smokeless tobacco: Never Used   • Alcohol Use: No   • Drug Use: No   • Sexual Activity:     Partners: Male     Other Topics Concern   • Not on file     Social History Narrative    Former  of a company in [place].   with lewy body dementia, she is the primary caregiver       Objective:     Vitals: /78 mmHg  Pulse 74  Resp 14  Ht 1.651 m (5' 5\")  Wt 69.854 kg (154 lb)  BMI 25.63 kg/m2  SpO2 93%  General: Alert, " pleasant, NAD  HEENT: Normocephalic.  PERRL, EOMI, no icterus or pallor.  Conjunctivae and lids normal. External ears normal.  Neck supple.  No thyromegaly or masses palpated. No cervical or supraclavicular lymphadenopathy.  Heart: Regular rate and rhythm.  S1 and S2 normal.  No murmurs appreciated.  Respiratory: Normal respiratory effort.  Clear to auscultation bilaterally.  Abdomen: Non-distended, soft, no guarding/rebound. Mildly tender to palpation in the right upper and lower quadrant. Bowel sounds present.  No hepatosplenomegaly.  No hernias.  Skin: Warm, dry, no rashes.  Psych:  Affect/mood is normal, judgement is fair, memory is intact, grooming is appropriate.    Assessment/Plan:     Diagnoses and all orders for this visit:    Nausea  RUQ pain  Bloating  RLQ abdominal pain  Differential includes diverticulitis, cholecystitis, gastric ulcer or gastritis given recent prednisone and Aleve use. Will check the following labs and CT scan. In the meantime, advised a liquid diet, omeprazole twice a day, Zofran as needed for nausea. Will call patient with results. Discussed reasons to go to the ER  -     CBC WITHOUT DIFFERENTIAL; Future  -     COMP METABOLIC PANEL; Future  -     LIPASE; Future  -     TSH WITH REFLEX TO FT4; Future  -     URINALYSIS,CULTURE IF INDICATED; Future  -     omeprazole (PRILOSEC) 20 MG delayed-release capsule; Take 1 Cap by mouth 2 times a day.  -     ondansetron (ZOFRAN) 4 MG Tab tablet; Take 1 Tab by mouth every four hours as needed for Nausea/Vomiting.  -     CT-ABDOMEN-PELVIS WITH & W/O; Future    Closed fracture of sacrum with routine healing, unspecified portion of sacrum, subsequent encounter  she has a Percocet prescription waiting at her spine surgeons office, the patient is wondering if we can prescribe it today to save her a trip. Spoke to the office over there, will prescribe it here today, they will destroy the prescription she has waiting for her there  -     Diclofenac Sodium  (VOLTAREN) 1 % Gel; Apply 4 g of 1% gel to affected area 4 times daily as needed (maximum: 16 g per joint per day) for pain  -     oxycodone-acetaminophen (PERCOCET) 5-325 MG Tab; Take 0.5-1 Tabs by mouth every 8 hours as needed for Severe Pain.      Patient was seen for a total of 25 minutes face-to-face, with more than half of the time spent counseling or coordinating care regarding the above mentioned problems.       Return if symptoms worsen or fail to improve.

## 2017-08-08 NOTE — MR AVS SNAPSHOT
"        Janell Tinsley   2017 3:00 PM   Office Visit   MRN: [id number]    Department:  [address] [place] [address]   Dept Phone:  [phone]    Description:  Female : 1940   Provider:  Amairani Bello M.D.           Allergies as of 2017     No Known Allergies      You were diagnosed with     Nausea   [603472]       RUQ pain   [706913]       Bloating   [502451]       RLQ abdominal pain   [123595]       Closed fracture of sacrum with routine healing, unspecified portion of sacrum, subsequent encounter   [9127591]         Vital Signs     Blood Pressure Pulse Respirations Height Weight Body Mass Index    136/78 mmHg 74 14 1.651 m (5' 5\") 69.854 kg (154 lb) 25.63 kg/m2    Oxygen Saturation Smoking Status                93% Never Smoker           Basic Information     Date Of Birth Sex Race Ethnicity Preferred Language    1940 Female White Non- English      Your appointments     Aug 09, 2017  4:00 PM   CT BODY WITH with [facility] CT 1   [facility] - [place] - [place]    [address]  [place] [address] [phone]           Some exams require specific prep instructions that would have been given to you at time of scheduling. If you have any additional questions about the prep instructions, please call Imaging Scheduling at [phone] and press #2.            Sep 08, 2017  2:40 PM   Established Patient with Amairani Bello M.D.   [facility] [address] ([facility])    [address]  [address]  [place] [address] [phone]           You will be receiving a confirmation call a few days before your appointment from our automated call confirmation system.              Problem List              ICD-10-CM Priority Class Noted - Resolved    Pancreatic cyst K86.2   10/22/2012 - Present    Essential hypertension I10   2013 - Present    Hyperlipemia E78.5   2014 - Present    Insomnia G47.00   2014 - Present    Osteoporosis M81.0   2015 - Present   "    Seasonal asthma J45.998   4/27/2015 - Present    MEDICAL HOME    5/22/2015 - Present    Situational mixed anxiety and depressive disorder F43.23   12/4/2015 - Present    Caregiver with fatigue R53.83   9/7/2016 - Present    Acquired hypothyroidism E03.9   6/8/2017 - Present    Closed fracture of sacrum with routine healing S32.10XD   8/8/2017 - Present      Health Maintenance        Date Due Completion Dates    IMM DTaP/Tdap/Td Vaccine (1 - Tdap) 2/19/1959 ---    IMM INFLUENZA (1) 9/1/2017 10/11/2016, 10/31/2015    COLONOSCOPY 1/13/2020 1/13/2010 (Done)    Override on 1/13/2010: Done (DR Duran-every 5 years)    BONE DENSITY 5/8/2020 5/8/2015, 4/27/2011 (Done)    Override on 4/27/2011: Done (Life screening test)            Current Immunizations     13-VALENT PCV PREVNAR 10/31/2015    Influenza Vaccine Adult HD 10/11/2016, 10/31/2015    Pneumococcal polysaccharide vaccine (PPSV-23) 11/3/2006    SHINGLES VACCINE 10/30/2013      Below and/or attached are the medications your provider expects you to take. Review all of your home medications and newly ordered medications with your provider and/or pharmacist. Follow medication instructions as directed by your provider and/or pharmacist. Please keep your medication list with you and share with your provider. Update the information when medications are discontinued, doses are changed, or new medications (including over-the-counter products) are added; and carry medication information at all times in the event of emergency situations     Allergies:  No Known Allergies          Medications  Valid as of: August 08, 2017 -  4:20 PM    Generic Name Brand Name Tablet Size Instructions for use    ALPRAZolam (Tab) XANAX 0.5 MG Take 1 Tab by mouth at bedtime as needed for Sleep.        Atorvastatin Calcium (Tab) LIPITOR 10 MG Take 1 Tab by mouth every day. Indications: high cholesterol        Azelastine HCl (Solution) OPTIVAR 0.05 % Place 1 Drop in both eyes every day.         Benazepril HCl (Tab) LOTENSIN 40 MG Take 1 Tab by mouth every day. Indications: High Blood Pressure        Cetirizine HCl (Tab) ZYRTEC 10 MG Take 10 mg by mouth every day.        Cholecalciferol (Cap) Vitamin D 2000 UNITS Take 1 Cap by mouth every day.        Diclofenac Sodium (Gel) Diclofenac Sodium 1 % Apply 4 g of 1% gel to affected area 4 times daily as needed (maximum: 16 g per joint per day) for pain        Furosemide (Tab) LASIX 20 MG Take 1 Tab by mouth every day. Indications: High Blood Pressure        Ipratropium Bromide (Solution) ATROVENT 0.02 % 0.2 mg by Nebulization route 4 times a day.        Levothyroxine Sodium (Tab) SYNTHROID 88 MCG Take 1 Tab by mouth Every morning on an empty stomach.        Magnesium Chloride (Tab CR) SLO- (64 MG) MG Take 535 mg by mouth every day.        Multiple Vitamins-Minerals (Tab) THERAGRAN-M  Take 1 Tab by mouth every day.        Omeprazole (CAPSULE DELAYED RELEASE) PRILOSEC 20 MG Take 1 Cap by mouth 2 times a day.        Ondansetron HCl (Tab) ZOFRAN 4 MG Take 1 Tab by mouth every four hours as needed for Nausea/Vomiting.        Oxycodone-Acetaminophen (Tab) PERCOCET 5-325 MG Take 0.5-1 Tabs by mouth every 8 hours as needed for Severe Pain.        Oyster Shell (Tab) OS-CUBA 500 500 MG Take 500 mg by mouth 2 times a day, with meals.        Polyethylene Glycol 3350 (Pack) MIRALAX  Take 17 g by mouth every day.        Probiotic Product   Take 1 Tab by mouth every day.        Sertraline HCl (Tab) ZOLOFT 100 MG Take 150 mg by mouth every day.        Zolpidem Tartrate (Tab) AMBIEN 10 MG Take 1 Tab by mouth at bedtime as needed for Sleep.        .                 Medicines prescribed today were sent to:     [id number] - [place], [place] - [address]    [address] [address]    Phone: [phone] Fax: [phone]    Open 24 Hours?: No      Medication refill instructions:       If your prescription bottle indicates you have medication refills  left, it is not necessary to call your provider’s office. Please contact your pharmacy and they will refill your medication.    If your prescription bottle indicates you do not have any refills left, you may request refills at any time through one of the following ways: The online Yesmail system (except Urgent Care), by calling your provider’s office, or by asking your pharmacy to contact your provider’s office with a refill request. Medication refills are processed only during regular business hours and may not be available until the next business day. Your provider may request additional information or to have a follow-up visit with you prior to refilling your medication.   *Please Note: Medication refills are assigned a new Rx number when refilled electronically. Your pharmacy may indicate that no refills were authorized even though a new prescription for the same medication is available at the pharmacy. Please request the medicine by name with the pharmacy before contacting your provider for a refill.        Your To Do List     Future Labs/Procedures Complete By Expires    CBC WITHOUT DIFFERENTIAL  As directed 2/5/2018    COMP METABOLIC PANEL  As directed 8/8/2018    CT-ABDOMEN-PELVIS WITH & W/O  As directed 8/8/2018    LIPASE  As directed 8/8/2018    TSH WITH REFLEX TO FT4  As directed 8/8/2018    URINALYSIS,CULTURE IF INDICATED  As directed 8/8/2018      Other Notes About Your Plan     Patient is enrolled in [facility] with Dr. Gracia.           [employer] Access Code: Activation code not generated  Current Yesmail Status: Active

## 2017-08-09 ENCOUNTER — HOSPITAL ENCOUNTER (OUTPATIENT)
Dept: RADIOLOGY | Facility: MEDICAL CENTER | Age: 77
End: 2017-08-09
Attending: FAMILY MEDICINE
Payer: MEDICARE

## 2017-08-09 ENCOUNTER — TELEPHONE (OUTPATIENT)
Dept: MEDICAL GROUP | Facility: MEDICAL CENTER | Age: 77
End: 2017-08-09

## 2017-08-09 DIAGNOSIS — R14.0 BLOATING: ICD-10-CM

## 2017-08-09 DIAGNOSIS — R11.0 NAUSEA: ICD-10-CM

## 2017-08-09 DIAGNOSIS — R10.31 RLQ ABDOMINAL PAIN: ICD-10-CM

## 2017-08-09 DIAGNOSIS — R10.11 RUQ PAIN: ICD-10-CM

## 2017-08-09 PROCEDURE — 700117 HCHG RX CONTRAST REV CODE 255: Performed by: FAMILY MEDICINE

## 2017-08-09 PROCEDURE — 74177 CT ABD & PELVIS W/CONTRAST: CPT

## 2017-08-09 RX ADMIN — IOHEXOL 100 ML: 350 INJECTION, SOLUTION INTRAVENOUS at 16:33

## 2017-08-09 NOTE — TELEPHONE ENCOUNTER
1. Caller Name: scarlett                                         Call Back Number: [phone] (home)         Patient approves a detailed voicemail message: yes  Patient called wanting to let you know she wasn't able to sleep last nite due to shoulder and back pain. She has been very nauseated and wants to know if she can take more of pain meds.

## 2017-08-09 NOTE — TELEPHONE ENCOUNTER
She can take two tabs of the zofran (ondansetron) for nausea every 8hrs if nauseated.  She can take more of the pain meds if she's in pain, but she should check with her spine doctor since the pain med instructions came from their office.

## 2017-08-10 ENCOUNTER — TELEPHONE (OUTPATIENT)
Dept: MEDICAL GROUP | Facility: MEDICAL CENTER | Age: 77
End: 2017-08-10

## 2017-08-10 LAB
BACTERIA UR CULT: NORMAL
SIGNIFICANT IND 70042: NORMAL
SOURCE SOURCE: NORMAL

## 2017-08-10 NOTE — TELEPHONE ENCOUNTER
Called patient to discuss labs and CT scan. Will increase MiraLAX to twice a day. She will follow-up with her surgeon regarding her back fractures. She will try to limit her use of opiates as this may worsen the constipation. Zofran seems to be helping with her nausea

## 2017-08-14 RX ORDER — GABAPENTIN 300 MG/1
300 CAPSULE ORAL 3 TIMES DAILY
Qty: 90 CAP | Refills: 3 | Status: SHIPPED | OUTPATIENT
Start: 2017-08-14 | End: 2017-08-21

## 2017-08-16 ENCOUNTER — HOSPITAL ENCOUNTER (OUTPATIENT)
Dept: RADIOLOGY | Facility: MEDICAL CENTER | Age: 77
End: 2017-08-16
Attending: PHYSICAL MEDICINE & REHABILITATION
Payer: MEDICARE

## 2017-08-16 DIAGNOSIS — M54.6 PAIN IN THORACIC SPINE: ICD-10-CM

## 2017-08-16 DIAGNOSIS — M48.061 SPINAL STENOSIS, LUMBAR REGION, WITHOUT NEUROGENIC CLAUDICATION: ICD-10-CM

## 2017-08-16 PROCEDURE — 72148 MRI LUMBAR SPINE W/O DYE: CPT

## 2017-08-16 PROCEDURE — 72146 MRI CHEST SPINE W/O DYE: CPT

## 2017-08-17 ENCOUNTER — HOSPITAL ENCOUNTER (OUTPATIENT)
Dept: RADIOLOGY | Facility: MEDICAL CENTER | Age: 77
End: 2017-08-17
Attending: PHYSICAL MEDICINE & REHABILITATION
Payer: MEDICARE

## 2017-08-17 DIAGNOSIS — M54.5 CHRONIC LOW BACK PAIN, UNSPECIFIED BACK PAIN LATERALITY, WITH SCIATICA PRESENCE UNSPECIFIED: ICD-10-CM

## 2017-08-17 DIAGNOSIS — G89.29 CHRONIC LOW BACK PAIN, UNSPECIFIED BACK PAIN LATERALITY, WITH SCIATICA PRESENCE UNSPECIFIED: ICD-10-CM

## 2017-08-17 DIAGNOSIS — M48.061 LUMBAR SPINAL STENOSIS: ICD-10-CM

## 2017-08-17 DIAGNOSIS — M54.6 PAIN IN THORACIC SPINE: ICD-10-CM

## 2017-08-17 NOTE — CONSULTS
Interventional Radiology Consultation      Re: Janell Tinsley     MRN: [id number]   : 1940    Janell Tinsley was referred by Pravin Vang MD.  She is a 77 y.o. female seen in clinic for evaluation and possible vertebral augmentation. She is also under the care of Amairani Bello MD.    History of Present Illness:   has a history of osteoporosis diagnosed on DEXA scan in . In early July she describes slipping and falling at a car wash and landing on her back, hitting her head and pelvis on the ground. She developed the onset of low back pain and pelvic pain. Initial imaging showed sacral insufficiency fractures but MRI did not show a T12 fracture. She then developed abdominal pain and a later CT on  showed a fracture at that level. Imaging from yesterday shows a T12 fracture and persistent sacral fractures. She underwent a spinal injection last Thursday but she does not notice any pain reduction. Today, s pain is the worst in her sacrum but she also has pain in her low back. The first pain medication she tried caused significant constipation and hallucinations. She is currently taking oxycodone 1/2 or 1 pill for the pain and taking Miralax to help with the constipation. It is very difficult for her to get comfortable. She can only sit for a short period of time in certain positions. She is wearing a lumbar brace today, which makes the pain a little better. Movement or activity makes the pain worse. She can sleep at night but takes an Ambien to fall asleep. She is not able to perform activities of daily living due to the pain, specifically work around the house. 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 She is not able to pick things up off the ground due to the pain and getting up out of bed is very difficult. She is walking with a cane since the pain started, however she states she had  "some imbalance and subsequent falls prior to the event that caused her pain and intends to keep using her cane.      She is seen today for review of imaging studies and discussion of possible vertebral augmentation of a compression fracture at T12 and sacroplasty for sacral insufficiency fractures.     Past Medical History   Diagnosis Date   • Thyroid activity decreased      medicated   • CAD (coronary artery disease)      medicated   • Hypertension      medicated   • ASTHMA      \"seasonal\"   • Infectious disease      MRSA   • Hyperlipemia 7/25/2014   • Insomnia 7/25/2014   • Pain of left heel 1/13/2015   • Back pain 7/25/2014     S/p epidural L4 radiculopathy Completed PT    • Arthritis 7/25/2014     S/P bilateral meniscectomies R knee pain > L    • History of colonic diverticulitis 1/30/2013     2 episodes in past year 2013-14      Past Surgical History   Procedure Laterality Date   • Other abdominal surgery       appy   • Gyn surgery       hysterectomy   • Other orthopedic surgery       right shoulder rotater cuff   • Other       tonsillectomy   • Knee arthroscopy  8/5/2010     Performed by RINA MONTESINOS at SURGERY SAME DAY [facility]   • Medial meniscectomy  8/5/2010     Performed by RINA MONTESINOS at SURGERY SAME DAY [facility]   • Meniscectomy  8/5/2010     Performed by RINA MONTESINOS at SURGERY SAME DAY [facility]   • Other  1970     laparotomy removed fibroid   • Gastroscopy with biopsy  10/22/2012     Performed by Santosh Medina M.D. at SURGERY [facility]   • Egd with asp/bx  10/22/2012     Performed by Santosh Medina M.D. at [facility]   • Abdominal hysterectomy total       Social History     Social History   • Marital Status:      Spouse Name: N/A   • Number of Children: N/A   • Years of Education: N/A     Occupational History   • Not on file.     Social History Main Topics   • Smoking status: Never Smoker    • Smokeless tobacco: Never Used   • " Alcohol Use: No   • Drug Use: No   • Sexual Activity:     Partners: Male     Other Topics Concern   • Not on file     Social History Narrative    Former  of a company in [place].   with lewy body dementia, she is the primary caregiver     Family History   Problem Relation Age of Onset   • Diabetes     • Hypertension     • Stroke     • Cancer     • Arthritis Mother    • Cancer Mother      cervical   • Alcohol abuse Mother    • Heart Disease Father    • Alcohol abuse Step-Father        Review of Systems:  Constitutional: negative for fevers  Gastrointestinal: positive for constipation  Musculoskeletal:positive for back pain, bone pain and muscle weakness  Neurological: positive for gait problems    A comprehensive 14-point review of systems was negative except as described above.     Labs:      Ref. Range 8/8/2017 16:54   WBC Latest Ref Range: 4.8-10.8 K/uL 10.0   RBC Latest Ref Range: 4.20-5.40 M/uL 4.88   Hemoglobin Latest Ref Range: 12.0-16.0 g/dL 13.9   Hematocrit Latest Ref Range: 37.0-47.0 % 41.8   MCV Latest Ref Range: 81.4-97.8 fL 85.7   MCH Latest Ref Range: 27.0-33.0 pg 28.5   MCHC Latest Ref Range: 33.6-35.0 g/dL 33.3 (L)   RDW Latest Ref Range: 35.9-50.0 fL 42.0   Platelet Count Latest Ref Range: 164-446 K/uL 306   MPV Latest Ref Range: 9.0-12.9 fL 9.4   Sodium Latest Ref Range: 135-145 mmol/L 139   Potassium Latest Ref Range: 3.6-5.5 mmol/L 3.2 (L)   Chloride Latest Ref Range:  mmol/L 104   Co2 Latest Ref Range: 20-33 mmol/L 26   Anion Gap Latest Ref Range: 0.0-11.9  9.0   Glucose Latest Ref Range: 65-99 mg/dL 94   Bun Latest Ref Range: 8-22 mg/dL 9   Creatinine Latest Ref Range: 0.50-1.40 mg/dL 0.67   GFR If  Latest Ref Range: >60 mL/min/1.73 m 2 >60   GFR If Non  Latest Ref Range: >60 mL/min/1.73 m 2 >60   Calcium Latest Ref Range: 8.5-10.5 mg/dL 9.9   AST(SGOT) Latest Ref Range: 12-45 U/L 20   ALT(SGPT) Latest Ref Range: 2-50 U/L 13   Alkaline  Phosphatase Latest Ref Range: 30-99 U/L 150 (H)   Total Bilirubin Latest Ref Range: 0.1-1.5 mg/dL 0.7   Albumin Latest Ref Range: 3.2-4.9 g/dL 4.4   Total Protein Latest Ref Range: 6.0-8.2 g/dL 7.1   Globulin Latest Ref Range: 1.9-3.5 g/dL 2.7   A-G Ratio Latest Units: g/dL 1.6   Lipase Latest Ref Range: 11-82 U/L 28       Radiology:   Review of imaging obtained at [facility]:  MRI thoracic spine August 16, 2017:  1.  Subacute T12 vertebral body compression fracture  2.  Mild thoracic spondylosis    MRI lumbar spine August 16, 2017:  1.  Transitional morphology of the designated L5 segment  2.  Subacute T12 vertebral compression fracture with 25-50% height loss but no retropulsed material  3.  Subacute sacral insufficiency fractures extending through S2-S3 and the BILATERAL sacral ala  4.  Multilevel multifactorial degenerative changes  5.  Central canal narrowing worst at L3-L4  6.  Areas of neural foraminal narrowing as described    MRI lumbar spine July 12, 2017:  1.  Acute or recent subacute sacral insufficiency fracture at the S2-S3 segments.  2.  Multilevel degenerative disc disease and spondylotic changes most notable for L3-L4 mild-moderate central stenosis.  3.  Additional degenerative and spondylotic changes as detailed for each level above in the body of report.    CT abdomen pelvis August 9, 2017:  1.  No secondary signs of acute appendicitis, however the appendix is not visualized.  2.  Colonic diverticulosis without evidence for diverticulitis.  3.  No focal mesenteric inflammatory process.  4.  Stable cystic lesions in the liver and pancreas, of doubtful clinical significance.  5.  Probable subacute to chronic sacral insufficiency fracture.  6.  Probable subacute T12 vertebral compression fracture.    DEXA scan May 8, 2015:  According to the World Health Organization classification, bone mineral density of this patient is osteoporotic.  10-year Probability of Fracture:  Major Osteoporotic     37.9%  Hip      27.3%  Population      [place] ()  Based on left femur neck BMD     Current Outpatient Prescriptions   Medication Sig Dispense Refill   • gabapentin (NEURONTIN) 300 MG Cap Take 1 Cap by mouth 3 times a day. 90 Cap 3   • omeprazole (PRILOSEC) 20 MG delayed-release capsule Take 1 Cap by mouth 2 times a day. 60 Cap 1   • Diclofenac Sodium (VOLTAREN) 1 % Gel Apply 4 g of 1% gel to affected area 4 times daily as needed (maximum: 16 g per joint per day) for pain 100 g 3   • ondansetron (ZOFRAN) 4 MG Tab tablet Take 1 Tab by mouth every four hours as needed for Nausea/Vomiting. 30 Tab 1   • oxycodone-acetaminophen (PERCOCET) 5-325 MG Tab Take 0.5-1 Tabs by mouth every 8 hours as needed for Severe Pain. 60 Tab 0   • sertraline (ZOLOFT) 100 MG Tab Take 150 mg by mouth every day.     • azelastine (OPTIVAR) 0.05 % ophthalmic solution Place 1 Drop in both eyes every day.     • benazepril (LOTENSIN) 40 MG tablet Take 1 Tab by mouth every day. Indications: High Blood Pressure 90 Tab 3   • SYNTHROID 88 MCG Tab Take 1 Tab by mouth Every morning on an empty stomach. 90 Tab 3   • furosemide (LASIX) 20 MG Tab Take 1 Tab by mouth every day. Indications: High Blood Pressure 90 Tab 2   • atorvastatin (LIPITOR) 10 MG Tab Take 1 Tab by mouth every day. Indications: high cholesterol 90 Tab 3   • alprazolam (XANAX) 0.5 MG Tab Take 1 Tab by mouth at bedtime as needed for Sleep. (Patient taking differently: Take 0.25 mg by mouth at bedtime as needed for Sleep.) 90 Tab 1   • zolpidem (AMBIEN) 10 MG Tab Take 1 Tab by mouth at bedtime as needed for Sleep. (Patient taking differently: Take 5 mg by mouth at bedtime as needed for Sleep.) 90 Tab 3   • calcium carbonate (CALCIUM CARBONATE) 500 MG Tab Take 500 mg by mouth 2 times a day, with meals.     • magnesium chloride SR (SLO-MAG) 535 (64 MG) MG Tab CR Take 535 mg by mouth every day.     • polyethylene glycol/lytes (MIRALAX) PACK Take 17 g by mouth every day.     • therapeutic  multivitamin-minerals (THERAGRAN-M) TABS Take 1 Tab by mouth every day.     • Cholecalciferol (VITAMIN D) 2000 UNITS CAPS Take 1 Cap by mouth every day.     • ipratropium (ATROVENT) 0.02 % SOLN 0.2 mg by Nebulization route 4 times a day.     • Probiotic Product (PROBIOTIC FORMULA PO) Take 1 Tab by mouth every day.     • cetirizine (ZYRTEC) 10 MG TABS Take 10 mg by mouth every day.       No current facility-administered medications for this encounter.       No Known Allergies    Physical Exam:  General Appearance: healthy, alert, no distress, cooperative  Skin: Skin color, texture, turgor normal. No rashes or lesions.  Back: positive findings: focal pain at the T12 vertebral body, lumbar support brace in place  Lungs: negative findings: normal respiratory rate and rhythm  Extremities: negative findings: strength normal, 5/5 bilateral lower extremities  There is focal pain with percussion of the vertebral body at T12 and in the sacrum.    Impression:   1. Acute sacral insufficiency fractures with delayed healing.  2. Acute T12 compression fracture with delayed healing.  3. Osteoporosis.  4. Low back pain.  5. Pelvic pain.  6. Hypertension.  7. Asthma.  8. Hypothyroid.    Plan:   Artemio Fagan MD has reviewed 's history and imaging studies, examined the patient, and discussed treatment options.  is a candidate for both a T12 vertebral augmentation as well as sacroplasty for painful insufficiency fracture. The acute pain the patient describes is directly related to the fractures. The worst pain is in her sacrum, therefore we will address those fractures first. We discussed the method of the procedures at length. We additionally discussed the risks, including bleeding and infection, reaction to the moderate sedation medications, and cement extravasation causing compression of a nerve or the spinal canal or pulmonary embolus and subsequent interventions. There is a small chance the procedure will not  alleviate the pain but a reasonable expectation is that she would get significant pain reduction within a couple of weeks. The patient may be at risk to develop future compression fractures. We discussed alternatives of the procedure including conservative medical management. The patient verbalizes understanding of the plan and elects to proceed. She has been scheduled for sacroplasty on August 22. We will see her back in clinic on August 25 and if the T12 area is still painful, will plan kyphoplasty on August 30.       SUZE Phillips with Artemio Fagan MD  Interventional Radiology  [address] ([address])  [place] [address]  [phone]

## 2017-08-21 ENCOUNTER — TELEPHONE (OUTPATIENT)
Dept: MEDICAL GROUP | Facility: MEDICAL CENTER | Age: 77
End: 2017-08-21

## 2017-08-21 DIAGNOSIS — Z01.812 PRE-OPERATIVE LABORATORY EXAMINATION: ICD-10-CM

## 2017-08-21 LAB
INR PPP: 0.96 (ref 0.87–1.13)
PROTHROMBIN TIME: 13.1 SEC (ref 12–14.6)

## 2017-08-21 PROCEDURE — 36415 COLL VENOUS BLD VENIPUNCTURE: CPT

## 2017-08-21 PROCEDURE — 85610 PROTHROMBIN TIME: CPT

## 2017-08-21 NOTE — TELEPHONE ENCOUNTER
Received my chart message asking for me to call the patient. No answer, voicemail left. She will mychart message or call if any issues

## 2017-08-22 ENCOUNTER — APPOINTMENT (OUTPATIENT)
Dept: RADIOLOGY | Facility: MEDICAL CENTER | Age: 77
End: 2017-08-22
Attending: RADIOLOGY
Payer: MEDICARE

## 2017-08-22 ENCOUNTER — HOSPITAL ENCOUNTER (OUTPATIENT)
Facility: MEDICAL CENTER | Age: 77
End: 2017-08-22
Attending: RADIOLOGY | Admitting: RADIOLOGY
Payer: MEDICARE

## 2017-08-22 VITALS
HEART RATE: 59 BPM | TEMPERATURE: 98 F | WEIGHT: 154.98 LBS | BODY MASS INDEX: 26.46 KG/M2 | OXYGEN SATURATION: 94 % | RESPIRATION RATE: 20 BRPM | DIASTOLIC BLOOD PRESSURE: 75 MMHG | HEIGHT: 64 IN | SYSTOLIC BLOOD PRESSURE: 155 MMHG

## 2017-08-22 DIAGNOSIS — M84.48XG SACRAL INSUFFICIENCY FRACTURE WITH DELAYED HEALING: ICD-10-CM

## 2017-08-22 PROBLEM — S22.080A T12 COMPRESSION FRACTURE (HCC): Status: ACTIVE | Noted: 2017-08-22

## 2017-08-22 PROCEDURE — 99153 MOD SED SAME PHYS/QHP EA: CPT

## 2017-08-22 PROCEDURE — 700111 HCHG RX REV CODE 636 W/ 250 OVERRIDE (IP)

## 2017-08-22 PROCEDURE — 160002 HCHG RECOVERY MINUTES (STAT)

## 2017-08-22 RX ORDER — SODIUM CHLORIDE 9 MG/ML
INJECTION, SOLUTION INTRAVENOUS
Status: DISCONTINUED | OUTPATIENT
Start: 2017-08-22 | End: 2017-08-22 | Stop reason: HOSPADM

## 2017-08-22 RX ORDER — CEFAZOLIN SODIUM 1 G/3ML
INJECTION, POWDER, FOR SOLUTION INTRAMUSCULAR; INTRAVENOUS
Status: COMPLETED
Start: 2017-08-22 | End: 2017-08-22

## 2017-08-22 RX ORDER — OXYCODONE HYDROCHLORIDE 5 MG/1
5 TABLET ORAL
Status: DISCONTINUED | OUTPATIENT
Start: 2017-08-22 | End: 2017-08-22 | Stop reason: HOSPADM

## 2017-08-22 RX ORDER — NALOXONE HYDROCHLORIDE 0.4 MG/ML
INJECTION, SOLUTION INTRAMUSCULAR; INTRAVENOUS; SUBCUTANEOUS
Status: COMPLETED
Start: 2017-08-22 | End: 2017-08-22

## 2017-08-22 RX ORDER — ONDANSETRON 2 MG/ML
4 INJECTION INTRAMUSCULAR; INTRAVENOUS EVERY 8 HOURS PRN
Status: DISCONTINUED | OUTPATIENT
Start: 2017-08-22 | End: 2017-08-22 | Stop reason: HOSPADM

## 2017-08-22 RX ORDER — MIDAZOLAM HYDROCHLORIDE 1 MG/ML
INJECTION INTRAMUSCULAR; INTRAVENOUS
Status: COMPLETED
Start: 2017-08-22 | End: 2017-08-22

## 2017-08-22 RX ORDER — MIDAZOLAM HYDROCHLORIDE 1 MG/ML
.5-2 INJECTION INTRAMUSCULAR; INTRAVENOUS PRN
Status: ACTIVE | OUTPATIENT
Start: 2017-08-22 | End: 2017-08-22

## 2017-08-22 RX ORDER — SODIUM CHLORIDE 9 MG/ML
500 INJECTION, SOLUTION INTRAVENOUS PRN
Status: DISCONTINUED | OUTPATIENT
Start: 2017-08-22 | End: 2017-08-22 | Stop reason: HOSPADM

## 2017-08-22 RX ORDER — ONDANSETRON 2 MG/ML
4 INJECTION INTRAMUSCULAR; INTRAVENOUS PRN
Status: ACTIVE | OUTPATIENT
Start: 2017-08-22 | End: 2017-08-22

## 2017-08-22 RX ADMIN — MIDAZOLAM HYDROCHLORIDE 2 MG: 1 INJECTION INTRAMUSCULAR; INTRAVENOUS at 13:26

## 2017-08-22 RX ADMIN — CEFAZOLIN 1000 MG: 1 INJECTION, POWDER, FOR SOLUTION INTRAVENOUS at 13:24

## 2017-08-22 RX ADMIN — MIDAZOLAM HYDROCHLORIDE 1 MG: 1 INJECTION INTRAMUSCULAR; INTRAVENOUS at 13:34

## 2017-08-22 RX ADMIN — MIDAZOLAM 2 MG: 1 INJECTION INTRAMUSCULAR; INTRAVENOUS at 13:26

## 2017-08-22 RX ADMIN — MIDAZOLAM HYDROCHLORIDE 1 MG: 1 INJECTION INTRAMUSCULAR; INTRAVENOUS at 13:36

## 2017-08-22 RX ADMIN — FENTANYL CITRATE 50 MCG: 50 INJECTION, SOLUTION INTRAMUSCULAR; INTRAVENOUS at 13:26

## 2017-08-22 ASSESSMENT — PAIN SCALES - GENERAL
PAINLEVEL_OUTOF10: 0

## 2017-08-22 NOTE — IP AVS SNAPSHOT
" Home Care Instructions                                                                                                                Name:Janell Tinsley  Medical Record Number:[id number]  CSN: [id number]    YOB: 1940   Age: 77 y.o.  Sex: female  HT:1.626 m (5' 4\") WT: 70.3 kg (154 lb 15.7 oz)          Admit Date: 8/22/2017     Discharge Date:   Today's Date: 8/22/2017  Attending Doctor:  Uriel Fagan M.D.                  Allergies:  Review of patient's allergies indicates no known allergies.                Discharge Instructions         ACTIVITY: Rest and take it easy for the first 24 hours.  A responsible adult is recommended to remain with you during that time.  It is normal to feel sleepy.  We encourage you to not do anything that requires balance, judgment or coordination.    MILD FLU-LIKE SYMPTOMS ARE NORMAL. YOU MAY EXPERIENCE GENERALIZED MUSCLE ACHES, THROAT IRRITATION, HEADACHE AND/OR SOME NAUSEA.    FOR 24 HOURS DO NOT:  Drive, operate machinery or run household appliances.  Drink beer or alcoholic beverages.   Make important decisions or sign legal documents.    SPECIAL INSTRUCTIONS:     Percutaneous Vertebroplasty/Sacroplasty, Care After  These instructions give you information on caring for yourself after your procedure. Your doctor may also give you more specific instructions. Call your doctor if you have any problems or questions after your procedure.  HOME CARE  · Take medicine as told by your doctor.  · Keep your wound dry and covered for 24 hours or as told by your doctor.  · Ask your doctor when you can bathe or shower.  · Put an ice pack on your wound.  ¨ Put ice in a plastic bag.  ¨ Place a towel between your skin and the bag.  ¨ Leave the ice on for 15-20 minutes, 3-4 times a day.  · Rest in your bed for 24 hours or as told by your doctor.  · Return to normal activities as told by your doctor.  · Ask your doctor what stretches and exercises you can do.  · Do not bend or lift " anything heavy as told by your doctor.  GET HELP IF:  · Your wound becomes red, puffy (swollen), or tender to the touch.  · You are bleeding or leaking fluid from the wound.  · You are sick to your stomach (nauseous) or throw up (vomit) for more than 24 hours after the procedure.  · Your back pain does not get better.  · You have a fever.  GET HELP RIGHT AWAY IF:   · You have bad back pain that comes on suddenly.  · You cannot control when you pee (urinate) or poop (bowel movement).  · You lose feeling (numbness) or have tingling in your legs or feet, or they become weak.  · You have sudden weakness in your arms or legs.  · You have shooting pain down your legs.  · You have chest pain or a hard time breathing.  · You feel dizzy or pass out (faint).  · Your vision changes or you cannot talk as you normally do.     This information is not intended to replace advice given to you by your health care provider. Make sure you discuss any questions you have with your health care provider.     Document Released: 03/14/2011 Document Revised: 12/23/2014 Document Reviewed: 08/26/2014  [employer] Interactive Patient Education ©2016 [employer].      DIET: To avoid nausea, slowly advance diet as tolerated, avoiding spicy or greasy foods for the first day.  Add more substantial food to your diet according to your physician's instructions.  Babies can be fed formula or breast milk as soon as they are hungry.  INCREASE FLUIDS AND FIBER TO AVOID CONSTIPATION.    SURGICAL DRESSING/BATHING: May remove dressing in 24 hours. May shower in 24 hours. Do not submerge in water for 7 days.     FOLLOW-UP APPOINTMENT:  A follow-up appointment should be arranged with your doctor; call to schedule.    You should CALL YOUR PHYSICIAN if you develop:  Fever greater than 101 degrees F.  Pain not relieved by medication, or persistent nausea or vomiting.  Excessive bleeding (blood soaking through dressing) or unexpected drainage from the wound.  Extreme  redness or swelling around the incision site, drainage of pus or foul smelling drainage.  Inability to urinate or empty your bladder within 8 hours.  Problems with breathing or chest pain.    You should call 911 if you develop problems with breathing or chest pain.  If you are unable to contact your doctor or surgical center, you should go to the nearest emergency room or urgent care center.  Physician's telephone #: Dr. Fagan [phone]      If any questions arise, call your doctor.  If your doctor is not available, please feel free to call the Surgical Center at [phone].  The Center is open Monday through Friday from 7AM to 7PM.  You can also call the HEALTH HOTLINE open 24 hours/day, 7 days/week and speak to a nurse at [phone], or toll free at [phone].    A registered nurse may call you a few days after your surgery to see how you are doing after your procedure.    MEDICATIONS: Resume taking daily medication.  Take prescribed pain medication with food.  If no medication is prescribed, you may take non-aspirin pain medication if needed.  PAIN MEDICATION CAN BE VERY CONSTIPATING.  Take a stool softener or laxative such as senokot, pericolace, or milk of magnesia if needed.      If your physician has prescribed pain medication that includes Acetaminophen (Tylenol), do not take additional Acetaminophen (Tylenol) while taking the prescribed medication.    Depression / Suicide Risk    As you are discharged from this [facility] Health facility, it is important to learn how to keep safe from harming yourself.    Recognize the warning signs:  · Abrupt changes in personality, positive or negative- including increase in energy   · Giving away possessions  · Change in eating patterns- significant weight changes-  positive or negative  · Change in sleeping patterns- unable to sleep or sleeping all the time   · Unwillingness or inability to communicate  · Depression  · Unusual sadness, discouragement and  loneliness  · Talk of wanting to die  · Neglect of personal appearance   · Rebelliousness- reckless behavior  · Withdrawal from people/activities they love  · Confusion- inability to concentrate     If you or a loved one observes any of these behaviors or has concerns about self-harm, here's what you can do:  · Talk about it- your feelings and reasons for harming yourself  · Remove any means that you might use to hurt yourself (examples: pills, rope, extension cords, firearm)  · Get professional help from the community (Mental Health, Substance Abuse, psychological counseling)  · Do not be alone:Call your Safe Contact- someone whom you trust who will be there for you.  · Call your local CRISIS HOTLINE 224-4537 or 221-175-9747  · Call your local Children's Mobile Crisis Response Team [place] (764) 604-2088 or www.[employer]  · Call the toll free National Suicide Prevention Hotlines   · National Suicide Prevention Lifeline 982-605-COMF (3560)  · [employer] Hope Line Network 800-SUICIDE (685-1829)       Medication List      CHANGE how you take these medications        Instructions    Morning Afternoon Evening Bedtime    alprazolam 0.5 MG Tabs   What changed:  how much to take   Commonly known as:  XANAX        Take 1 Tab by mouth at bedtime as needed for Sleep.   Dose:  0.5 mg                        zolpidem 10 MG Tabs   What changed:  how much to take   Commonly known as:  AMBIEN        Doctor's comments:  [employer] brand or Mylan, please.   Take 1 Tab by mouth at bedtime as needed for Sleep.   Dose:  10 mg                          CONTINUE taking these medications        Instructions    Morning Afternoon Evening Bedtime    atorvastatin 10 MG Tabs   Commonly known as:  LIPITOR        Take 1 Tab by mouth every day. Indications: high cholesterol   Dose:  10 mg                        benazepril 40 MG tablet   Commonly known as:  LOTENSIN        Take 1 Tab by mouth every day. Indications: High Blood Pressure      Dose:  40 mg                        calcium carbonate 500 MG Tabs   Commonly known as:  OS-CUBA 500        Take 500 mg by mouth 2 times a day, with meals.   Dose:  500 mg                        cetirizine 10 MG Tabs   Commonly known as:  ZYRTEC        Take 10 mg by mouth every day.   Dose:  10 mg                        furosemide 20 MG Tabs   Commonly known as:  LASIX        Take 1 Tab by mouth every day. Indications: High Blood Pressure   Dose:  20 mg                        magnesium chloride  (64 Mg) MG Tbcr   Commonly known as:  SLO-MAG        Take 535 mg by mouth every day.   Dose:  535 mg                        ondansetron 4 MG Tabs tablet   Commonly known as:  ZOFRAN        Take 1 Tab by mouth every four hours as needed for Nausea/Vomiting.   Dose:  4 mg                        OPTIVAR 0.05 % ophthalmic solution   Generic drug:  azelastine        Place 1 Drop in both eyes every day.   Dose:  1 Drop                        oxycodone-acetaminophen 5-325 MG Tabs   Commonly known as:  PERCOCET        Take 0.5-1 Tabs by mouth every 8 hours as needed for Severe Pain.   Dose:  0.5-1 Tab                        polyethylene glycol/lytes Pack   Commonly known as:  MIRALAX        Take 17 g by mouth every day.   Dose:  17 g                        PROBIOTIC FORMULA PO        Take 1 Tab by mouth every day.   Dose:  1 Tab                        sertraline 100 MG Tabs   Commonly known as:  ZOLOFT        Take 150 mg by mouth every day.   Dose:  150 mg                        SYNTHROID 88 MCG Tabs   Generic drug:  levothyroxine        Take 1 Tab by mouth Every morning on an empty stomach.   Dose:  88 mcg                        therapeutic multivitamin-minerals Tabs        Take 1 Tab by mouth every day.   Dose:  1 Tab                        Vitamin D 2000 units Caps        Take 1 Cap by mouth every day.   Dose:  1 Cap                                Medication Information     Above and/or attached are the medications your  physician expects you to take upon discharge. Review all of your home medications and newly ordered medications with your doctor and/or pharmacist. Follow medication instructions as directed by your doctor and/or pharmacist. Please keep your medication list with you and share with your physician. Update the information when medications are discontinued, doses are changed, or new medications (including over-the-counter products) are added; and carry medication information at all times in the event of emergency situations.        Resources     Quit Smoking / Tobacco Use:    I understand the use of any tobacco products increases my chance of suffering from future heart disease or stroke and could cause other illnesses which may shorten my life. Quitting the use of tobacco products is the single most important thing I can do to improve my health. For further information on smoking / tobacco cessation call a Toll Free Quit Line at 1-322.350.6176 (*[employer]) or 1-316.250.5705 (American Lung Association) or you can access the web based program at www.lungJobzella.org.    [place] Tobacco Users Help Line:  (245) 662-6450       Toll Free: 1-652.772.7840  Quit Tobacco Program [facility] (729)948-8669    Crisis Hotline:    [place] Crisis Hotline:  9-912-JBBNSCR or 1-114.812.4086    [place] Crisis Hotline:    1-214.921.1730 or 639-092-7031    Discharge Survey:   Thank you for choosing [facility]. We hope we did everything we could to make your hospital stay a pleasant one. You may be receiving a survey and we would appreciate your time and participation in answering the questions. Your input is very valuable to us in our efforts to improve our service to our patients and their families.            Signatures     My signature on this form indicates that:    1. I acknowledge receipt and understanding of these Home Care Instruction.  2. My questions regarding this information have been answered  to my satisfaction.  3. I have formulated a plan with my discharge nurse to obtain my prescribed medications for home.    __________________________________      __________________________________                   Patient Signature                                 Guardian/Responsible Adult Signature      __________________________________                 __________       ________                       Nurse Signature                                               Date                 Time

## 2017-08-22 NOTE — OR SURGEON
Immediate Post- Operative Note        PostOp Diagnosis: sacral fractures      Procedure(s): BILATERAL sacroplasty      Estimated Blood Loss: Less than 5 ml        Complications: None            8/22/2017     2:00 PM     Uriel Fagan

## 2017-08-22 NOTE — PROGRESS NOTES
Patient underwent a CT guided scaralplasty by Dr. Fagan. Pt remained hemodynamically stable during procedure. Report called to Moisés CARTER. Pt transported by [facility] to [facility].   [employer] Ref # [id number] #[id number]

## 2017-08-22 NOTE — PROGRESS NOTES
Report received and Pt admitted to [facility] 4 s/p sacroplasty. Site CDI with no s/s of bleeding or hematoma. Pt attached to all leads and monitors. VSS with no complaints of pain or nausea. Fluids and food offered. Orders reviewed.

## 2017-08-22 NOTE — IP AVS SNAPSHOT
8/22/2017    Janell Tinsley  [address] [place] [address]    Dear Janell:    [facility] wants to ensure your discharge home is safe and you or your loved ones have had all of your questions answered regarding your care after you leave the hospital.    Below is a list of resources and contact information should you have any questions regarding your hospital stay, follow-up instructions, or active medical symptoms.    Questions or Concerns Regarding… Contact   Medical Questions Related to Your Discharge  (7 days a week, 8am-5pm) Contact a Nurse Care Coordinator   [phone]   Medical Questions Not Related to Your Discharge  (24 hours a day / 7 days a week)  Contact the Nurse Health Line   [phone]    Medications or Discharge Instructions Refer to your discharge packet   or contact your [facility] Primary Care Provider   [phone]   Follow-up Appointment(s) Schedule your appointment via [employer]   or contact Scheduling [phone]   Billing Review your statement via [employer]  or contact Billing [phone]   Medical Records Review your records via [employer]   or contact Medical Records [phone]     You may receive a telephone call within two days of discharge. This call is to make certain you understand your discharge instructions and have the opportunity to have any questions answered. You can also easily access your medical information, test results and upcoming appointments via the [employer] free online health management tool. You can learn more and sign up at [employer] Images. For assistance setting up your [employer] account, please call [phone].    Once again, we want to ensure your discharge home is safe and that you have a clear understanding of any next steps in your care. If you have any questions or concerns, please do not hesitate to contact us, we are here for you. Thank you for choosing [facility] for your healthcare needs.    Sincerely,    Your [facility] Healthcare Team

## 2017-08-22 NOTE — PROGRESS NOTES
Pt. sacroplasty site CDI with no s/s of bleeding or hematoma. Pt. Meets discharge criteria. Pt. And responsible adult given discharge instructions. Pt. And responsible adult educated and all questions answered. Signed copy on chart. All lines and drains DC'd. Pt. Belongings with Pt and Pt. Transported via wheel chair to car with escort.

## 2017-08-22 NOTE — DISCHARGE INSTRUCTIONS
ACTIVITY: Rest and take it easy for the first 24 hours.  A responsible adult is recommended to remain with you during that time.  It is normal to feel sleepy.  We encourage you to not do anything that requires balance, judgment or coordination.    MILD FLU-LIKE SYMPTOMS ARE NORMAL. YOU MAY EXPERIENCE GENERALIZED MUSCLE ACHES, THROAT IRRITATION, HEADACHE AND/OR SOME NAUSEA.    FOR 24 HOURS DO NOT:  Drive, operate machinery or run household appliances.  Drink beer or alcoholic beverages.   Make important decisions or sign legal documents.    SPECIAL INSTRUCTIONS:     Percutaneous Vertebroplasty/Sacroplasty, Care After  These instructions give you information on caring for yourself after your procedure. Your doctor may also give you more specific instructions. Call your doctor if you have any problems or questions after your procedure.  HOME CARE  · Take medicine as told by your doctor.  · Keep your wound dry and covered for 24 hours or as told by your doctor.  · Ask your doctor when you can bathe or shower.  · Put an ice pack on your wound.  ¨ Put ice in a plastic bag.  ¨ Place a towel between your skin and the bag.  ¨ Leave the ice on for 15-20 minutes, 3-4 times a day.  · Rest in your bed for 24 hours or as told by your doctor.  · Return to normal activities as told by your doctor.  · Ask your doctor what stretches and exercises you can do.  · Do not bend or lift anything heavy as told by your doctor.  GET HELP IF:  · Your wound becomes red, puffy (swollen), or tender to the touch.  · You are bleeding or leaking fluid from the wound.  · You are sick to your stomach (nauseous) or throw up (vomit) for more than 24 hours after the procedure.  · Your back pain does not get better.  · You have a fever.  GET HELP RIGHT AWAY IF:   · You have bad back pain that comes on suddenly.  · You cannot control when you pee (urinate) or poop (bowel movement).  · You lose feeling (numbness) or have tingling in your legs or feet, or  they become weak.  · You have sudden weakness in your arms or legs.  · You have shooting pain down your legs.  · You have chest pain or a hard time breathing.  · You feel dizzy or pass out (faint).  · Your vision changes or you cannot talk as you normally do.     This information is not intended to replace advice given to you by your health care provider. Make sure you discuss any questions you have with your health care provider.     Document Released: 03/14/2011 Document Revised: 12/23/2014 Document Reviewed: 08/26/2014  [employer] Interactive Patient Education ©2016 Elsevier Inc.      DIET: To avoid nausea, slowly advance diet as tolerated, avoiding spicy or greasy foods for the first day.  Add more substantial food to your diet according to your physician's instructions.  Babies can be fed formula or breast milk as soon as they are hungry.  INCREASE FLUIDS AND FIBER TO AVOID CONSTIPATION.    SURGICAL DRESSING/BATHING: May remove dressing in 24 hours. May shower in 24 hours. Do not submerge in water for 7 days.     FOLLOW-UP APPOINTMENT:  A follow-up appointment should be arranged with your doctor; call to schedule.    You should CALL YOUR PHYSICIAN if you develop:  Fever greater than 101 degrees F.  Pain not relieved by medication, or persistent nausea or vomiting.  Excessive bleeding (blood soaking through dressing) or unexpected drainage from the wound.  Extreme redness or swelling around the incision site, drainage of pus or foul smelling drainage.  Inability to urinate or empty your bladder within 8 hours.  Problems with breathing or chest pain.    You should call 911 if you develop problems with breathing or chest pain.  If you are unable to contact your doctor or surgical center, you should go to the nearest emergency room or urgent care center.  Physician's telephone #: Dr. Fagan [phone]      If any questions arise, call your doctor.  If your doctor is not available, please feel free to call the  Surgical Center at [phone].  The Center is open Monday through Friday from 7AM to 7PM.  You can also call the HEALTH HOTLINE open 24 hours/day, 7 days/week and speak to a nurse at [phone], or toll free at [phone].    A registered nurse may call you a few days after your surgery to see how you are doing after your procedure.    MEDICATIONS: Resume taking daily medication.  Take prescribed pain medication with food.  If no medication is prescribed, you may take non-aspirin pain medication if needed.  PAIN MEDICATION CAN BE VERY CONSTIPATING.  Take a stool softener or laxative such as senokot, pericolace, or milk of magnesia if needed.      If your physician has prescribed pain medication that includes Acetaminophen (Tylenol), do not take additional Acetaminophen (Tylenol) while taking the prescribed medication.    Depression / Suicide Risk    As you are discharged from this [facility] facility, it is important to learn how to keep safe from harming yourself.    Recognize the warning signs:  · Abrupt changes in personality, positive or negative- including increase in energy   · Giving away possessions  · Change in eating patterns- significant weight changes-  positive or negative  · Change in sleeping patterns- unable to sleep or sleeping all the time   · Unwillingness or inability to communicate  · Depression  · Unusual sadness, discouragement and loneliness  · Talk of wanting to die  · Neglect of personal appearance   · Rebelliousness- reckless behavior  · Withdrawal from people/activities they love  · Confusion- inability to concentrate     If you or a loved one observes any of these behaviors or has concerns about self-harm, here's what you can do:  · Talk about it- your feelings and reasons for harming yourself  · Remove any means that you might use to hurt yourself (examples: pills, rope, extension cords, firearm)  · Get professional help from the community (Mental Health, Substance Abuse,  psychological counseling)  · Do not be alone:Call your Safe Contact- someone whom you trust who will be there for you.  · Call your local CRISIS HOTLINE 204-5964 or 918-503-6159  · Call your local Children's Mobile Crisis Response Team [place] (198) 719-0616 or www.[employer]  · Call the toll free National Suicide Prevention Hotlines   · National Suicide Prevention Lifeline 735-483-RRVW (4499)  · National Hope Line Network 800-SUICIDE (221-3390)

## 2017-08-25 ENCOUNTER — HOSPITAL ENCOUNTER (OUTPATIENT)
Dept: RADIOLOGY | Facility: MEDICAL CENTER | Age: 77
End: 2017-08-25
Attending: RADIOLOGY

## 2017-08-25 DIAGNOSIS — M84.48XG SACRAL INSUFFICIENCY FRACTURE WITH DELAYED HEALING: ICD-10-CM

## 2017-08-25 NOTE — CONSULTS
"Interventional Radiology Consultation    Re: Janell Tinsley  MRN: [id number]   : 1940    Janell Tinsley was initially referred by Pravin Vang MD.  She is a 77 y.o. female seen in clinic for evaluation after recent sacroplasty and possible T12 vertebral augmentation. She is also under the care of Amairani Bello MD.    History of Present Illness:   has a history of osteoporosis diagnosed on DEXA scan in . In early July she describes slipping and falling at a car wash and landing on her back, hitting her head and pelvis on the ground. She developed the onset of low back pain and pelvic pain. Initial imaging showed sacral insufficiency fractures but MRI did not show a T12 fracture. She then developed abdominal pain and a later CT on  showed a fracture at that level. Imaging showed an acute T12 vertebral insufficiency fracture and persistent sacral fractures. She underwent a spinal injection two weeks ago without noticeable relief. She was referred for intervention of the sacral insufficiency fractures and the T12 compression fracture. At our initial evaluation, the pain in her sacrum was the worst and most limiting of her pain, therefore our plan was to address the sacral fractures first. She underwent uncomplicated sacroplasty on  and is seen today in follow up after that procedure and to evaluate if the T12 fracture is painful enough to treat. Of note, she reports she has equilibrium problems and \"falls all the time\" despite the use of a cane and in fact disclosed to her procedure RN that she fell the morning of the procedure on . She has not fallen since the procedure. She plans to start physical therapy once the next procedure is complete to work on balance. Today, 's pain has improved in her sacrum but she also has pain in her low back, bilateral hips, and anterior abdomen when she stands up. She is still currently taking oxycodone 1/2 or 1 pill for the " "pain as needed and taking Miralax to help with the constipation. She additionally complains of right shoulder bursitis from the use of the cane.    She is seen today for review of imaging studies post sacroplasty and discussion of possible vertebral augmentation of a compression fracture at T12.     Past Medical History   Diagnosis Date   • Thyroid activity decreased      medicated   • CAD (coronary artery disease)      medicated   • Hypertension      medicated   • ASTHMA      \"seasonal\"   • Infectious disease      MRSA   • Hyperlipemia 7/25/2014   • Insomnia 7/25/2014   • Pain of left heel 1/13/2015   • Back pain 7/25/2014     S/p epidural L4 radiculopathy Completed PT    • Arthritis 7/25/2014     S/P bilateral meniscectomies R knee pain > L    • History of colonic diverticulitis 1/30/2013     2 episodes in past year 2013-14    • Compression fracture of T12 vertebra with delayed healing 2017   • Sacral insufficiency fracture with delayed healing 2017     Past Surgical History   Procedure Laterality Date   • Other abdominal surgery       appy   • Gyn surgery       hysterectomy   • Other orthopedic surgery       right shoulder rotater cuff   • Other       tonsillectomy   • Knee arthroscopy  8/5/2010     Performed by RINA MONTESINOS at SURGERY SAME DAY [facility] ORS   • Medial meniscectomy  8/5/2010     Performed by RINA MONTESINOS at SURGERY SAME DAY [facility] ORS   • Meniscectomy  8/5/2010     Performed by RINA MONTESINOS at SURGERY SAME DAY [facility] ORS   • Other  1970     laparotomy removed fibroid   • Gastroscopy with biopsy  10/22/2012     Performed by Santosh Medina M.D. at SURGERY [facility]   • Egd with asp/bx  10/22/2012     Performed by Santosh Medina M.D. at [facility]   • Abdominal hysterectomy total       Social History     Social History   • Marital Status:      Spouse Name: N/A   • Number of Children: N/A   • Years of Education: N/A     Occupational History "   • Not on file.     Social History Main Topics   • Smoking status: Never Smoker    • Smokeless tobacco: Never Used   • Alcohol Use: No   • Drug Use: No   • Sexual Activity:     Partners: Male     Other Topics Concern   • Not on file     Social History Narrative    Former  of a company in [place].   with lewy body dementia, she is the primary caregiver     Family History   Problem Relation Age of Onset   • Diabetes     • Hypertension     • Stroke     • Cancer     • Arthritis Mother    • Cancer Mother      cervical   • Alcohol abuse Mother    • Heart Disease Father    • Alcohol abuse Step-Father        Review of Systems:  Constitutional: negative  Gastrointestinal: positive for constipation  Musculoskeletal:positive for arthralgias, back pain, bone pain and muscle weakness  Neurological: positive for coordination problems and balance problems  positive for frequent falls    A comprehensive 14-point review of systems was negative except as described above.     Labs:      Ref. Range 8/8/2017 16:54 8/21/2017 15:06   WBC Latest Ref Range: 4.8-10.8 K/uL 10.0    RBC Latest Ref Range: 4.20-5.40 M/uL 4.88    Hemoglobin Latest Ref Range: 12.0-16.0 g/dL 13.9    Hematocrit Latest Ref Range: 37.0-47.0 % 41.8    MCV Latest Ref Range: 81.4-97.8 fL 85.7    MCH Latest Ref Range: 27.0-33.0 pg 28.5    MCHC Latest Ref Range: 33.6-35.0 g/dL 33.3 (L)    RDW Latest Ref Range: 35.9-50.0 fL 42.0    Platelet Count Latest Ref Range: 164-446 K/uL 306    MPV Latest Ref Range: 9.0-12.9 fL 9.4    Sodium Latest Ref Range: 135-145 mmol/L 139    Potassium Latest Ref Range: 3.6-5.5 mmol/L 3.2 (L)    Chloride Latest Ref Range:  mmol/L 104    Co2 Latest Ref Range: 20-33 mmol/L 26    Anion Gap Latest Ref Range: 0.0-11.9  9.0    Glucose Latest Ref Range: 65-99 mg/dL 94    Bun Latest Ref Range: 8-22 mg/dL 9    Creatinine Latest Ref Range: 0.50-1.40 mg/dL 0.67    GFR If  Latest Ref Range: >60 mL/min/1.73 m 2 >60    GFR If  Non  Latest Ref Range: >60 mL/min/1.73 m 2 >60    Calcium Latest Ref Range: 8.5-10.5 mg/dL 9.9    AST(SGOT) Latest Ref Range: 12-45 U/L 20    ALT(SGPT) Latest Ref Range: 2-50 U/L 13    Alkaline Phosphatase Latest Ref Range: 30-99 U/L 150 (H)    Total Bilirubin Latest Ref Range: 0.1-1.5 mg/dL 0.7    Albumin Latest Ref Range: 3.2-4.9 g/dL 4.4    Total Protein Latest Ref Range: 6.0-8.2 g/dL 7.1    Globulin Latest Ref Range: 1.9-3.5 g/dL 2.7    A-G Ratio Latest Units: g/dL 1.6    Lipase Latest Ref Range: 11-82 U/L 28    PT Latest Ref Range: 12.0-14.6 sec  13.1   INR Latest Ref Range: 0.87-1.13   0.96       Radiology:    Review of imaging obtained at [facility]:  CT procedure August 22, 2017:  CT-guided BILATERAL sacroplasty    MRI thoracic spine August 16, 2017:  1.  Subacute T12 vertebral body compression fracture  2.  Mild thoracic spondylosis    MRI lumbar spine August 16, 2017:  1.  Transitional morphology of the designated L5 segment  2.  Subacute T12 vertebral compression fracture with 25-50% height loss but no retropulsed material  3.  Subacute sacral insufficiency fractures extending through S2-S3 and the BILATERAL sacral ala  4.  Multilevel multifactorial degenerative changes  5.  Central canal narrowing worst at L3-L4  6.  Areas of neural foraminal narrowing as described    MRI lumbar spine July 12, 2017:  1.  Acute or recent subacute sacral insufficiency fracture at the S2-S3 segments.  2.  Multilevel degenerative disc disease and spondylotic changes most notable for L3-L4 mild-moderate central stenosis.  3.  Additional degenerative and spondylotic changes as detailed for each level above in the body of report.    CT abdomen pelvis August 9, 2017:  1.  No secondary signs of acute appendicitis, however the appendix is not visualized.  2.  Colonic diverticulosis without evidence for diverticulitis.  3.  No focal mesenteric inflammatory process.  4.  Stable cystic lesions in the liver and pancreas,  of doubtful clinical significance.  5.  Probable subacute to chronic sacral insufficiency fracture.  6.  Probable subacute T12 vertebral compression fracture.    DEXA scan May 8, 2015:  According to the World Health Organization classification, bone mineral density of this patient is osteoporotic.  10-year Probability of Fracture:  Major Osteoporotic     37.9%  Hip     27.3%  Population      [place] ()  Based on left femur neck BMD      Current Outpatient Prescriptions   Medication Sig Dispense Refill   • ondansetron (ZOFRAN) 4 MG Tab tablet Take 1 Tab by mouth every four hours as needed for Nausea/Vomiting. 30 Tab 1   • oxycodone-acetaminophen (PERCOCET) 5-325 MG Tab Take 0.5-1 Tabs by mouth every 8 hours as needed for Severe Pain. 60 Tab 0   • sertraline (ZOLOFT) 100 MG Tab Take 150 mg by mouth every day.     • azelastine (OPTIVAR) 0.05 % ophthalmic solution Place 1 Drop in both eyes every day.     • benazepril (LOTENSIN) 40 MG tablet Take 1 Tab by mouth every day. Indications: High Blood Pressure 90 Tab 3   • SYNTHROID 88 MCG Tab Take 1 Tab by mouth Every morning on an empty stomach. 90 Tab 3   • furosemide (LASIX) 20 MG Tab Take 1 Tab by mouth every day. Indications: High Blood Pressure 90 Tab 2   • atorvastatin (LIPITOR) 10 MG Tab Take 1 Tab by mouth every day. Indications: high cholesterol 90 Tab 3   • alprazolam (XANAX) 0.5 MG Tab Take 1 Tab by mouth at bedtime as needed for Sleep. (Patient taking differently: Take 0.25 mg by mouth at bedtime as needed for Sleep.) 90 Tab 1   • zolpidem (AMBIEN) 10 MG Tab Take 1 Tab by mouth at bedtime as needed for Sleep. (Patient taking differently: Take 5 mg by mouth at bedtime as needed for Sleep.) 90 Tab 3   • calcium carbonate (CALCIUM CARBONATE) 500 MG Tab Take 500 mg by mouth 2 times a day, with meals.     • magnesium chloride SR (SLO-MAG) 535 (64 MG) MG Tab CR Take 535 mg by mouth every day.     • polyethylene glycol/lytes (MIRALAX) PACK Take 17 g by mouth every  day.     • therapeutic multivitamin-minerals (THERAGRAN-M) TABS Take 1 Tab by mouth every day.     • Cholecalciferol (VITAMIN D) 2000 UNITS CAPS Take 1 Cap by mouth every day.     • Probiotic Product (PROBIOTIC FORMULA PO) Take 1 Tab by mouth every day.     • cetirizine (ZYRTEC) 10 MG TABS Take 10 mg by mouth every day.       No current facility-administered medications for this encounter.       No Known Allergies    Physical Exam:  General Appearance: healthy, alert, no distress, cooperative  Skin: Skin color, texture, turgor normal. No rashes or lesions.  Back: positive findings: focal pain at the T12 vertebral body, no lumbar support brace today. Sacral incisions with mild edema, no ecchymosis, hematoma, or oozing, needle access sites healing well. Dressings removed.  Lungs: negative findings: normal respiratory rate and rhythm  Extremities: negative findings: strength normal, 5/5 bilateral lower extremities  There is focal pain with percussion of the vertebral body at T12.    Impression:    1. Acute T12 compression fracture with delayed healing.  2. Acute sacral insufficiency fractures with delayed healing, status post sacroplasty.  3. Osteoporosis.  4. Low back pain.  5. Pelvic pain.  6. Hypertension.  7. Asthma.  8. Hypothyroid.    Plan:   Artemio Fagan MD has reviewed 's history and imaging studies, examined the patient, and discussed treatment options.  is recovering from the sacroplasty earlier this week and already notes an improvement in the pain when she sits. We expect she will continue to improve over the next few weeks as she is only 3 days post procedure. She is still a candidate for the T12 vertebral augmentation as her low back pain persists despite the intervention to the sacral fractures. The acute low back pain the patient describes is directly related to the T12 fracture. The other pains she describes should be evaluated by her treating pain physician once the kyphoplasty  procedure is complete as it may be unrelated. We discussed the method of the procedures at length and reviewed her imaging studies with her. We additionally discussed the risks, including bleeding and infection, reaction to the moderate sedation medications, and cement extravasation causing compression of a nerve or the spinal canal or pulmonary embolus and subsequent interventions. There is a small chance the procedure will not alleviate the pain but a reasonable expectation is that she would get significant pain reduction within a couple of weeks. The patient may be at risk to develop future compression fractures and should continue her osteoporosis treatment. We discussed alternatives of the procedure including conservative medical management. The patient verbalizes understanding of the plan and elects to proceed. She is scheduled for kyphoplasty on August 30. She should be able to start physical therapy on September 5. We will see her back in clinic after about a month to evaluate her progress.      SUZE Phillips with Artemio Fagan MD  Interventional Radiology  [facility]    [id number]  [place] [address]  [phone]

## 2017-08-30 ENCOUNTER — HOSPITAL ENCOUNTER (OUTPATIENT)
Facility: MEDICAL CENTER | Age: 77
End: 2017-08-30
Attending: RADIOLOGY | Admitting: RADIOLOGY
Payer: MEDICARE

## 2017-08-30 ENCOUNTER — APPOINTMENT (OUTPATIENT)
Dept: RADIOLOGY | Facility: MEDICAL CENTER | Age: 77
End: 2017-08-30
Attending: RADIOLOGY
Payer: MEDICARE

## 2017-08-30 VITALS
BODY MASS INDEX: 25.95 KG/M2 | OXYGEN SATURATION: 94 % | RESPIRATION RATE: 17 BRPM | WEIGHT: 152 LBS | HEART RATE: 66 BPM | SYSTOLIC BLOOD PRESSURE: 158 MMHG | DIASTOLIC BLOOD PRESSURE: 63 MMHG | HEIGHT: 64 IN | TEMPERATURE: 98.2 F

## 2017-08-30 DIAGNOSIS — S22.080G COMPRESSION FRACTURE OF T12 VERTEBRA WITH DELAYED HEALING: ICD-10-CM

## 2017-08-30 PROCEDURE — 700102 HCHG RX REV CODE 250 W/ 637 OVERRIDE(OP)

## 2017-08-30 PROCEDURE — 4410019 IR-KYPHOPLASTY,1 VERTEBRA,THOR

## 2017-08-30 PROCEDURE — 700111 HCHG RX REV CODE 636 W/ 250 OVERRIDE (IP)

## 2017-08-30 PROCEDURE — 160002 HCHG RECOVERY MINUTES (STAT)

## 2017-08-30 PROCEDURE — 700111 HCHG RX REV CODE 636 W/ 250 OVERRIDE (IP): Performed by: RADIOLOGY

## 2017-08-30 PROCEDURE — A9270 NON-COVERED ITEM OR SERVICE: HCPCS

## 2017-08-30 PROCEDURE — 22513 PERQ VERTEBRAL AUGMENTATION: CPT

## 2017-08-30 RX ORDER — ONDANSETRON 2 MG/ML
4 INJECTION INTRAMUSCULAR; INTRAVENOUS EVERY 8 HOURS PRN
Status: DISCONTINUED | OUTPATIENT
Start: 2017-08-30 | End: 2017-08-30 | Stop reason: HOSPADM

## 2017-08-30 RX ORDER — SODIUM CHLORIDE 9 MG/ML
INJECTION, SOLUTION INTRAVENOUS
Status: DISCONTINUED | OUTPATIENT
Start: 2017-08-30 | End: 2017-08-30 | Stop reason: HOSPADM

## 2017-08-30 RX ORDER — MIDAZOLAM HYDROCHLORIDE 1 MG/ML
INJECTION INTRAMUSCULAR; INTRAVENOUS
Status: COMPLETED
Start: 2017-08-30 | End: 2017-08-30

## 2017-08-30 RX ORDER — OXYCODONE HYDROCHLORIDE 5 MG/1
TABLET ORAL
Status: COMPLETED
Start: 2017-08-30 | End: 2017-08-30

## 2017-08-30 RX ORDER — ONDANSETRON 2 MG/ML
4 INJECTION INTRAMUSCULAR; INTRAVENOUS PRN
Status: ACTIVE | OUTPATIENT
Start: 2017-08-30 | End: 2017-08-30

## 2017-08-30 RX ORDER — SODIUM CHLORIDE 9 MG/ML
500 INJECTION, SOLUTION INTRAVENOUS PRN
Status: DISCONTINUED | OUTPATIENT
Start: 2017-08-30 | End: 2017-08-30 | Stop reason: HOSPADM

## 2017-08-30 RX ORDER — CEFAZOLIN SODIUM 1 G/3ML
INJECTION, POWDER, FOR SOLUTION INTRAMUSCULAR; INTRAVENOUS
Status: COMPLETED
Start: 2017-08-30 | End: 2017-08-30

## 2017-08-30 RX ORDER — OXYCODONE HYDROCHLORIDE 5 MG/1
5 TABLET ORAL
Status: DISCONTINUED | OUTPATIENT
Start: 2017-08-30 | End: 2017-08-30 | Stop reason: HOSPADM

## 2017-08-30 RX ORDER — MIDAZOLAM HYDROCHLORIDE 1 MG/ML
.5-2 INJECTION INTRAMUSCULAR; INTRAVENOUS PRN
Status: ACTIVE | OUTPATIENT
Start: 2017-08-30 | End: 2017-08-30

## 2017-08-30 RX ADMIN — FENTANYL CITRATE 25 MCG: 50 INJECTION, SOLUTION INTRAMUSCULAR; INTRAVENOUS at 14:26

## 2017-08-30 RX ADMIN — SODIUM CHLORIDE: 9 INJECTION, SOLUTION INTRAVENOUS at 12:57

## 2017-08-30 RX ADMIN — FENTANYL CITRATE 25 MCG: 50 INJECTION, SOLUTION INTRAMUSCULAR; INTRAVENOUS at 14:11

## 2017-08-30 RX ADMIN — MIDAZOLAM 2 MG: 1 INJECTION INTRAMUSCULAR; INTRAVENOUS at 14:00

## 2017-08-30 RX ADMIN — MIDAZOLAM 2 MG: 1 INJECTION INTRAMUSCULAR; INTRAVENOUS at 14:15

## 2017-08-30 RX ADMIN — MIDAZOLAM 2 MG: 1 INJECTION INTRAMUSCULAR; INTRAVENOUS at 14:13

## 2017-08-30 RX ADMIN — OXYCODONE HYDROCHLORIDE: 5 TABLET ORAL at 16:06

## 2017-08-30 RX ADMIN — FENTANYL CITRATE 25 MCG: 50 INJECTION, SOLUTION INTRAMUSCULAR; INTRAVENOUS at 14:20

## 2017-08-30 RX ADMIN — FENTANYL CITRATE 50 MCG: 50 INJECTION, SOLUTION INTRAMUSCULAR; INTRAVENOUS at 14:07

## 2017-08-30 RX ADMIN — MIDAZOLAM 2 MG: 1 INJECTION INTRAMUSCULAR; INTRAVENOUS at 14:10

## 2017-08-30 RX ADMIN — CEFAZOLIN 1000 MG: 1 INJECTION, POWDER, FOR SOLUTION INTRAVENOUS at 14:09

## 2017-08-30 RX ADMIN — FENTANYL CITRATE 25 MCG: 50 INJECTION, SOLUTION INTRAMUSCULAR; INTRAVENOUS at 14:00

## 2017-08-30 ASSESSMENT — PAIN SCALES - GENERAL
PAINLEVEL_OUTOF10: 0
PAINLEVEL_OUTOF10: 0
PAINLEVEL_OUTOF10: 4

## 2017-08-30 NOTE — OR SURGEON
Immediate Post- Operative Note        PostOp Diagnosis: T12 VCF      Procedure(s): T12 kyphoplasty      Estimated Blood Loss: Less than 5 ml        Complications: None            8/30/2017     2:34 PM     Uriel Fagan

## 2017-08-30 NOTE — OR NURSING
1450 patient arrived from IR s/p T12 kyphoplasty, dressing clean dry intact, pain s.o.b, n/v,n/t, discussed plan of care with patient agreeable.  1530 patient tolerating oral fluids and snacks.  1600 patient c/o back pain 5/10 will medicate for pain as ordered.  1655 patient still c/o back pain 4/10 offered to given more pain medication patient refused wants to go home and take her pain pills at home.  Discharge instructions given to patient, patient verbalize understanding of the orders, iv discontinued tip intact, no c/o s.o.b or chest pain, patient escorted via w/c with all her personal belongings.

## 2017-08-30 NOTE — DISCHARGE INSTRUCTIONS
ACTIVITY: Rest and take it easy for the first 24 hours.  A responsible adult is recommended to remain with you during that time.  It is normal to feel sleepy.  We encourage you to not do anything that requires balance, judgment or coordination.    MILD FLU-LIKE SYMPTOMS ARE NORMAL. YOU MAY EXPERIENCE GENERALIZED MUSCLE ACHES, THROAT IRRITATION, HEADACHE AND/OR SOME NAUSEA.    FOR 24 HOURS DO NOT:  Drive, operate machinery or run household appliances.  Drink beer or alcoholic beverages.   Make important decisions or sign legal documents.    SPECIAL INSTRUCTIONS: follow up with primary care physician as needed  Call Dr hunt with any questions [phone]  If you experience chest pain, s.o.b, call 911 return to ER     DIET: To avoid nausea, slowly advance diet as tolerated, avoiding spicy or greasy foods for the first day.  Add more substantial food to your diet according to your physician's instructions.  Babies can be fed formula or breast milk as soon as they are hungry.  INCREASE FLUIDS AND FIBER TO AVOID CONSTIPATION.    SURGICAL DRESSING/BATHING: keep dressing clean and dry for 24 hours, you may remove dressing after 24 hours.    FOLLOW-UP APPOINTMENT:  A follow-up appointment should be arranged with your doctor in [phone]; call to schedule.    You should CALL YOUR PHYSICIAN if you develop:  Fever greater than 101 degrees F.  Pain not relieved by medication, or persistent nausea or vomiting.  Excessive bleeding (blood soaking through dressing) or unexpected drainage from the wound.  Extreme redness or swelling around the incision site, drainage of pus or foul smelling drainage.  Inability to urinate or empty your bladder within 8 hours.  Problems with breathing or chest pain.    You should call 911 if you develop problems with breathing or chest pain.  If you are unable to contact your doctor or surgical center, you should go to the nearest emergency room or urgent care center.  Physician's telephone #:  [phone]    If any questions arise, call your doctor.  If your doctor is not available, please feel free to call the Surgical Center at [phone].  The Center is open Monday through Friday from 7AM to 7PM.  You can also call the HEALTH HOTLINE open 24 hours/day, 7 days/week and speak to a nurse at [phone], or toll free at [phone].    A registered nurse may call you a few days after your surgery to see how you are doing after your procedure.    MEDICATIONS: Resume taking daily medication.  Take prescribed pain medication with food.  If no medication is prescribed, you may take non-aspirin pain medication if needed.  PAIN MEDICATION CAN BE VERY CONSTIPATING.  Take a stool softener or laxative such as senokot, pericolace, or milk of magnesia if needed.  Percutaneous Vertebroplasty, Care After  These instructions give you information on caring for yourself after your procedure. Your doctor may also give you more specific instructions. Call your doctor if you have any problems or questions after your procedure.  HOME CARE  · Take medicine as told by your doctor.  · Keep your wound dry and covered for 24 hours or as told by your doctor.  · Ask your doctor when you can bathe or shower.  · Put an ice pack on your wound.  ¨ Put ice in a plastic bag.  ¨ Place a towel between your skin and the bag.  ¨ Leave the ice on for 15-20 minutes, 3-4 times a day.  · Rest in your bed for 24 hours or as told by your doctor.  · Return to normal activities as told by your doctor.  · Ask your doctor what stretches and exercises you can do.  · Do not bend or lift anything heavy as told by your doctor.  GET HELP IF:  · Your wound becomes red, puffy (swollen), or tender to the touch.  · You are bleeding or leaking fluid from the wound.  · You are sick to your stomach (nauseous) or throw up (vomit) for more than 24 hours after the procedure.  · Your back pain does not get better.  · You have a fever.  GET HELP RIGHT AWAY IF:    · You have bad back pain that comes on suddenly.  · You cannot control when you pee (urinate) or poop (bowel movement).  · You lose feeling (numbness) or have tingling in your legs or feet, or they become weak.  · You have sudden weakness in your arms or legs.  · You have shooting pain down your legs.  · You have chest pain or a hard time breathing.  · You feel dizzy or pass out (faint).  · Your vision changes or you cannot talk as you normally do.     This information is not intended to replace advice given to you by your health care provider. Make sure you discuss any questions you have with your health care provider.     Document Released: 03/14/2011 Document Revised: 12/23/2014 Document Reviewed: 08/26/2014  [employer] Interactive Patient Education ©2016 [employer]      If your physician has prescribed pain medication that includes Acetaminophen (Tylenol), do not take additional Acetaminophen (Tylenol) while taking the prescribed medication.    Depression / Suicide Risk    As you are discharged from this [facility] facility, it is important to learn how to keep safe from harming yourself.    Recognize the warning signs:  · Abrupt changes in personality, positive or negative- including increase in energy   · Giving away possessions  · Change in eating patterns- significant weight changes-  positive or negative  · Change in sleeping patterns- unable to sleep or sleeping all the time   · Unwillingness or inability to communicate  · Depression  · Unusual sadness, discouragement and loneliness  · Talk of wanting to die  · Neglect of personal appearance   · Rebelliousness- reckless behavior  · Withdrawal from people/activities they love  · Confusion- inability to concentrate     If you or a loved one observes any of these behaviors or has concerns about self-harm, here's what you can do:  · Talk about it- your feelings and reasons for harming yourself  · Remove any means that you might use to hurt yourself  (examples: pills, rope, extension cords, firearm)  · Get professional help from the community (Mental Health, Substance Abuse, psychological counseling)  · Do not be alone:Call your Safe Contact- someone whom you trust who will be there for you.  · Call your local CRISIS HOTLINE 936-7049 or 666-695-8419  · Call your local Children's Mobile Crisis Response Team [place] (863) 202-0668 or www.[employer]  · Call the toll free National Suicide Prevention Hotlines   · National Suicide Prevention Lifeline 013-654-NSYH (3725)  · National Hope Line Network 800-SUICIDE (749-4093)

## 2017-08-30 NOTE — PROGRESS NOTES
IR RN note:    T12 kyphoplasty by MD Fagan assisted by RT Cast,T12 posterior access site;  Cement placed into T12   End Tidal CO2 range 25-45 during procedure    Patient tolerated procedure, hemodynamically stable; report given to [facility] RN Efrain;   patient transported back to [facility] via RN

## 2017-09-05 ENCOUNTER — TELEPHONE (OUTPATIENT)
Dept: MEDICAL GROUP | Facility: MEDICAL CENTER | Age: 77
End: 2017-09-05

## 2017-09-05 RX ORDER — OXYCODONE HYDROCHLORIDE AND ACETAMINOPHEN 5; 325 MG/1; MG/1
1-2 TABLET ORAL EVERY 8 HOURS PRN
Qty: 30 TAB | Refills: 0 | Status: SHIPPED | OUTPATIENT
Start: 2017-09-05 | End: 2017-12-15

## 2017-09-05 NOTE — TELEPHONE ENCOUNTER
1. Caller Name: Janell Tinsley                                           Call Back Number: [phone] (home)         Patient approves a detailed voicemail message: N\A    I called the patient and explained to her that I had told her that I was sending the medication that she wanted ordered to the doctor to look at so that the doctor could approve the medication and then if doctor wanted she would fill the medication and write the prescription and when the prescription was ready to  someone from the office would call the patient to let her know it was approved and ready to , patient then replied okay then I do not need to hurry down to  the medication, I let patient know that we would call her she said okay and hung up.

## 2017-09-06 NOTE — TELEPHONE ENCOUNTER
Received [employer] message from patient asking for a phone call. Patient has some complaints with a staff member at this office. will relay message to our  to address

## 2017-09-08 ENCOUNTER — OFFICE VISIT (OUTPATIENT)
Dept: MEDICAL GROUP | Facility: MEDICAL CENTER | Age: 77
End: 2017-09-08
Payer: MEDICARE

## 2017-09-08 VITALS
SYSTOLIC BLOOD PRESSURE: 122 MMHG | OXYGEN SATURATION: 96 % | HEIGHT: 65 IN | TEMPERATURE: 98.6 F | BODY MASS INDEX: 24.99 KG/M2 | WEIGHT: 150 LBS | RESPIRATION RATE: 14 BRPM | DIASTOLIC BLOOD PRESSURE: 80 MMHG | HEART RATE: 70 BPM

## 2017-09-08 DIAGNOSIS — S32.10XD CLOSED FRACTURE OF SACRUM WITH ROUTINE HEALING, UNSPECIFIED PORTION OF SACRUM, SUBSEQUENT ENCOUNTER: ICD-10-CM

## 2017-09-08 DIAGNOSIS — F51.01 PRIMARY INSOMNIA: ICD-10-CM

## 2017-09-08 DIAGNOSIS — S22.080D T12 COMPRESSION FRACTURE, WITH ROUTINE HEALING, SUBSEQUENT ENCOUNTER: ICD-10-CM

## 2017-09-08 DIAGNOSIS — R53.83 CAREGIVER WITH FATIGUE: ICD-10-CM

## 2017-09-08 DIAGNOSIS — Z23 NEED FOR VACCINATION: ICD-10-CM

## 2017-09-08 DIAGNOSIS — I10 ESSENTIAL HYPERTENSION: ICD-10-CM

## 2017-09-08 DIAGNOSIS — E03.9 ACQUIRED HYPOTHYROIDISM: ICD-10-CM

## 2017-09-08 DIAGNOSIS — E78.00 PURE HYPERCHOLESTEROLEMIA: ICD-10-CM

## 2017-09-08 PROCEDURE — G0008 ADMIN INFLUENZA VIRUS VAC: HCPCS | Performed by: FAMILY MEDICINE

## 2017-09-08 PROCEDURE — 99214 OFFICE O/P EST MOD 30 MIN: CPT | Mod: 25 | Performed by: FAMILY MEDICINE

## 2017-09-08 PROCEDURE — 90662 IIV NO PRSV INCREASED AG IM: CPT | Performed by: FAMILY MEDICINE

## 2017-09-08 RX ORDER — ZOLPIDEM TARTRATE 5 MG/1
5 TABLET ORAL NIGHTLY PRN
Qty: 30 TAB | Refills: 2 | Status: SHIPPED | OUTPATIENT
Start: 2017-09-08 | End: 2017-12-15 | Stop reason: SDUPTHER

## 2017-09-08 NOTE — PROGRESS NOTES
cc:  Follow up    Subjective:     Janell Tinsley is a 77 y.o. female presenting for a follow-up:    1. Back pain: Has undergone a kyphoplasty for her fractures in her spine. Continues to have pain. Well-controlled on the oxycodone as needed. Has a follow-up appointment with her spine doctor soon     2. Insomnia: has been able to cut down to xanax 0.25mg and ambien 2.5mg nightly (was on xanax 0.5 and ambien 10).   she has tried stopping the Xanax but is unable to sleep.     3. HTN: has hx of arterial hypertension, stable on lasix and benazepril. Denies any chest pain, shortness of breath, leg swelling, jaw claudication, lightheadedness, dizziness.  Last cmp was 8/2017     4. HLD: is on atorvastatin 10mg daily. No myalgias. Last lipids 6/2017     5.  Hypothyroidism:   is on synthroid 88 mcg daily.  Denies any heat/cold intolerance, diarrhea/constipation, abdominal pain, tremors, skin changes. Last tsh 8/2017    Review of systems:  See above.  Continues to care for her  who has lewy body dementia, is trying to sell her home, is planning to move to [place] to be closer to family.      Current Outpatient Prescriptions:   •  zolpidem (AMBIEN) 5 MG Tab, Take 1 Tab by mouth at bedtime as needed for Sleep., Disp: 30 Tab, Rfl: 2  •  oxycodone-acetaminophen (PERCOCET) 5-325 MG Tab, Take 1-2 Tabs by mouth every 8 hours as needed for Severe Pain., Disp: 30 Tab, Rfl: 0  •  ondansetron (ZOFRAN) 4 MG Tab tablet, Take 1 Tab by mouth every four hours as needed for Nausea/Vomiting., Disp: 30 Tab, Rfl: 1  •  sertraline (ZOLOFT) 100 MG Tab, Take 150 mg by mouth every day., Disp: , Rfl:   •  azelastine (OPTIVAR) 0.05 % ophthalmic solution, Place 1 Drop in both eyes every day., Disp: , Rfl:   •  benazepril (LOTENSIN) 40 MG tablet, Take 1 Tab by mouth every day. Indications: High Blood Pressure, Disp: 90 Tab, Rfl: 3  •  SYNTHROID 88 MCG Tab, Take 1 Tab by mouth Every morning on an empty stomach., Disp: 90 Tab, Rfl: 3  •  furosemide  "(LASIX) 20 MG Tab, Take 1 Tab by mouth every day. Indications: High Blood Pressure, Disp: 90 Tab, Rfl: 2  •  atorvastatin (LIPITOR) 10 MG Tab, Take 1 Tab by mouth every day. Indications: high cholesterol, Disp: 90 Tab, Rfl: 3  •  alprazolam (XANAX) 0.5 MG Tab, Take 1 Tab by mouth at bedtime as needed for Sleep. (Patient taking differently: Take 0.25 mg by mouth at bedtime as needed for Sleep.), Disp: 90 Tab, Rfl: 1  •  calcium carbonate (CALCIUM CARBONATE) 500 MG Tab, Take 500 mg by mouth 2 times a day, with meals., Disp: , Rfl:   •  magnesium chloride SR (SLO-MAG) 535 (64 MG) MG Tab CR, Take 535 mg by mouth every day., Disp: , Rfl:   •  polyethylene glycol/lytes (MIRALAX) PACK, Take 17 g by mouth every day., Disp: , Rfl:   •  therapeutic multivitamin-minerals (THERAGRAN-M) TABS, Take 1 Tab by mouth every day., Disp: , Rfl:   •  Cholecalciferol (VITAMIN D) 2000 UNITS CAPS, Take 1 Cap by mouth every day., Disp: , Rfl:   •  Probiotic Product (PROBIOTIC FORMULA PO), Take 1 Tab by mouth every day., Disp: , Rfl:   •  cetirizine (ZYRTEC) 10 MG TABS, Take 10 mg by mouth every day., Disp: , Rfl:     No Known Allergies    Past Medical History:   Diagnosis Date   • Compression fracture of T12 vertebra with delayed healing 2017   • Sacral insufficiency fracture with delayed healing 2017   • Pain of left heel 1/13/2015   • Hyperlipemia 7/25/2014   • Insomnia 7/25/2014   • Back pain 7/25/2014    S/p epidural L4 radiculopathy Completed PT    • Arthritis 7/25/2014    S/P bilateral meniscectomies R knee pain > L    • History of colonic diverticulitis 1/30/2013    2 episodes in past year 2013-14    • ASTHMA     \"seasonal\"   • CAD (coronary artery disease)     medicated   • Hypertension     medicated   • Infectious disease     MRSA   • Thyroid activity decreased     medicated     Past Surgical History:   Procedure Laterality Date   • GASTROSCOPY WITH BIOPSY  10/22/2012    Performed by Santosh Medina M.D. at [facility]" [facility]   • EGD WITH ASP/BX  10/22/2012    Performed by Santosh Medina M.D. at SURGERY [facility]   • KNEE ARTHROSCOPY  8/5/2010    Performed by RINA MONTESINOS at SURGERY [facility] ORS   • MEDIAL MENISCECTOMY  8/5/2010    Performed by RINA MONTESINOS at SURGERY [facility] DAY [facility] ORS   • MENISCECTOMY  8/5/2010    Performed by RINA MONTESINOS at SURGERY [facility] DAY [facility] ORS   • OTHER  1970    laparotomy removed fibroid   • ABDOMINAL HYSTERECTOMY TOTAL     • GYN SURGERY      hysterectomy   • OTHER      tonsillectomy   • OTHER ABDOMINAL SURGERY      appy   • OTHER ORTHOPEDIC SURGERY      right shoulder rotater cuff     Family History   Problem Relation Age of Onset   • Arthritis Mother    • Cancer Mother      cervical   • Alcohol abuse Mother    • Heart Disease Father    • Alcohol abuse Step-Father    • Diabetes     • Hypertension     • Stroke     • Cancer       Social History     Social History   • Marital status:      Spouse name: N/A   • Number of children: N/A   • Years of education: N/A     Occupational History   • Not on file.     Social History Main Topics   • Smoking status: Never Smoker   • Smokeless tobacco: Never Used   • Alcohol use No   • Drug use: No   • Sexual activity: Yes     Partners: Male     Other Topics Concern   • Not on file     Social History Narrative    Former  of a company in [place].   with lewy body dementia, she is the primary caregiver       Objective:     Vitals: /80   Pulse 70   Temp 37 °C (98.6 °F)   Resp 14   Ht 1.651 m (5' 5\")   Wt 68 kg (150 lb)   SpO2 96%   BMI 24.96 kg/m²   General: Alert, pleasant, NAD  HEENT: Normocephalic.   Psych:  Affect/mood is normal, judgement is good, memory is intact, grooming is appropriate.    Assessment/Plan:     Janell was seen today for follow-up.    Diagnoses and all orders for this visit:    T12 compression fracture, with routine healing, subsequent encounter  Closed fracture of sacrum " with routine healing, unspecified portion of sacrum, subsequent encounter  Stable, followed by her spine doctor. Continue pain medications as needed. Discussed the risks of combining her pain medications with her sleep medications.    Need for vaccination  -     INFLUENZA VACCINE, HIGH DOSE (65+ ONLY)    Primary insomnia  Long discussion regarding the risks of Xanax and Ambien use for sleep. She has been able to cut down to Ambien 2.5 mg, was on Ambien 10 mg.  -     zolpidem (AMBIEN) 5 MG Tab; Take 1 Tab by mouth at bedtime as needed for Sleep.    Acquired hypothyroidism  Stable, labs are up-to-date. Continue current meds    Essential hypertension  Stable, labs are up-to-date. Continue current meds    Pure hypercholesterolemia  Stable, labs are up-to-date. Continue current med    Caregiver with fatigue  Offered support.        Return in about 6 months (around 3/8/2018) for Med check.

## 2017-09-15 ENCOUNTER — TELEPHONE (OUTPATIENT)
Dept: MEDICAL GROUP | Facility: MEDICAL CENTER | Age: 77
End: 2017-09-15

## 2017-09-15 NOTE — TELEPHONE ENCOUNTER
1. Caller Name: gabby                                         Call Back Number: [phone] (home)         Patient approves a detailed voicemail message: yes    Patient called wanting to talk to you regarding some hospice questions she has

## 2017-09-15 NOTE — TELEPHONE ENCOUNTER
Called patient regarding hospice for her . Had several questions about medications and physician appointments. She is not quite sure if he really needs hospice. Recommended that she continue with hospice for her  for now, she will call if she has anymore questions or concerns.

## 2017-09-26 ENCOUNTER — HOSPITAL ENCOUNTER (OUTPATIENT)
Dept: RADIOLOGY | Facility: MEDICAL CENTER | Age: 77
End: 2017-09-26
Attending: RADIOLOGY

## 2017-09-26 DIAGNOSIS — M54.5 ACUTE LOW BACK PAIN, UNSPECIFIED BACK PAIN LATERALITY, WITH SCIATICA PRESENCE UNSPECIFIED: ICD-10-CM

## 2017-09-26 NOTE — CONSULTS
Interventional Radiology Follow Up     Re: Janell Tinsley     MRN: [id number]   : 1940    Janell Tinsley was seen today in follow up after vertebral augmentation of T12 and sacroplasty performed by Artemio Fagan MD at [facility].  She was referred to our service by Pravin Vang MD and is also under the care of Amairani Bello MD.    History of Present Illness:   presented with severe intractable low back pain and pelvic pain after a fall in early July. She has a history of frequent falls and osteoporosis per DEXA scan. Imaging revealed both an acute T12 vertebral compression fracture and sacral insufficiency fractures that clinically correlated with the patient's complaint of acute pain. The patient was referred to Artemio Fagan MD for evaluation and management of the fractures. Her pelvic pain was greater, therefore Dr. Fagan first performed sacroplasty on  and then T12 vertebral augmentation on . The patient's clinical course was unremarkable. She was discharged from [facility] on the same day. She is seen today for pain assessment in follow up after the procedure. Today, the patient feels much improved. Her pelvic pain and low back pain have resolved. She denies any problems with healing after the procedure. She denies any falls since she was last seen and has been using a cane more consistently. Her chief complaint is neck pain and limited range of motion and intermittent mid to upper back pain above the bra line. She has an appointment with Dr. Vang this afternoon for trigger point injections. She no longer takes daily oxycodone. She takes it about two days a week to help relieve her pain when she goes to sleep. She takes daily Miralax and denies constipation. She drove herself to her appointment today.    Past Medical History:   Diagnosis Date   • Compression fracture of T12 vertebra with delayed healing    • Sacral insufficiency fracture with delayed healing    • Pain of  "left heel 1/13/2015   • Hyperlipemia 7/25/2014   • Insomnia 7/25/2014   • Back pain 7/25/2014    S/p epidural L4 radiculopathy Completed PT    • Arthritis 7/25/2014    S/P bilateral meniscectomies R knee pain > L    • History of colonic diverticulitis 1/30/2013    2 episodes in past year 2013-14    • ASTHMA     \"seasonal\"   • CAD (coronary artery disease)     medicated   • Hypertension     medicated   • Infectious disease     MRSA   • Thyroid activity decreased     medicated     Past Surgical History:   Procedure Laterality Date   • GASTROSCOPY WITH BIOPSY  10/22/2012    Performed by Santosh Medina M.D. at SURGERY [facility]S   • EGD WITH ASP/BX  10/22/2012    Performed by Santosh Medina M.D. at SURGERY [facility]S   • KNEE ARTHROSCOPY  8/5/2010    Performed by RINA MONTESINOS at SURGERY SAME DAY [facility]S   • MEDIAL MENISCECTOMY  8/5/2010    Performed by RINA MONTESINOS at SURGERY [facility] DAY [facility]   • MENISCECTOMY  8/5/2010    Performed by RINA MONTESINOS at SURGERY SAME DAY [facility]   • OTHER  1970    laparotomy removed fibroid   • ABDOMINAL HYSTERECTOMY TOTAL     • GYN SURGERY      hysterectomy   • OTHER      tonsillectomy   • OTHER ABDOMINAL SURGERY      appy   • OTHER ORTHOPEDIC SURGERY      right shoulder rotater cuff     Social History     Social History   • Marital status:      Spouse name: N/A   • Number of children: N/A   • Years of education: N/A     Occupational History   • Not on file.     Social History Main Topics   • Smoking status: Never Smoker   • Smokeless tobacco: Never Used   • Alcohol use No   • Drug use: No   • Sexual activity: Yes     Partners: Male     Other Topics Concern   • Not on file     Social History Narrative    Former  of a company in [place].   with lewy body dementia, she is the primary caregiver     Family History   Problem Relation Age of Onset   • Arthritis Mother    • Cancer Mother      cervical   • Alcohol abuse " Mother    • Heart Disease Father    • Alcohol abuse Step-Father    • Diabetes     • Hypertension     • Stroke     • Cancer         Review of Systems:  Integument/breast: negative for pain or discharge at incision sites  Musculoskeletal:positive for bone pain and neck pain, negative for pelvic pain    A comprehensive 14-point review of systems was negative except as described above.     Labs:      Ref. Range 8/8/2017 16:54 8/21/2017 15:06   WBC Latest Ref Range: 4.8 - 10.8 K/uL 10.0    RBC Latest Ref Range: 4.20 - 5.40 M/uL 4.88    Hemoglobin Latest Ref Range: 12.0 - 16.0 g/dL 13.9    Hematocrit Latest Ref Range: 37.0 - 47.0 % 41.8    MCV Latest Ref Range: 81.4 - 97.8 fL 85.7    MCH Latest Ref Range: 27.0 - 33.0 pg 28.5    MCHC Latest Ref Range: 33.6 - 35.0 g/dL 33.3 (L)    RDW Latest Ref Range: 35.9 - 50.0 fL 42.0    Platelet Count Latest Ref Range: 164 - 446 K/uL 306    MPV Latest Ref Range: 9.0 - 12.9 fL 9.4    Sodium Latest Ref Range: 135 - 145 mmol/L 139    Potassium Latest Ref Range: 3.6 - 5.5 mmol/L 3.2 (L)    Chloride Latest Ref Range: 96 - 112 mmol/L 104    Co2 Latest Ref Range: 20 - 33 mmol/L 26    Anion Gap Latest Ref Range: 0.0 - 11.9  9.0    Glucose Latest Ref Range: 65 - 99 mg/dL 94    Bun Latest Ref Range: 8 - 22 mg/dL 9    Creatinine Latest Ref Range: 0.50 - 1.40 mg/dL 0.67    GFR If  Latest Ref Range: >60 mL/min/1.73 m 2 >60    GFR If Non  Latest Ref Range: >60 mL/min/1.73 m 2 >60    Calcium Latest Ref Range: 8.5 - 10.5 mg/dL 9.9    AST(SGOT) Latest Ref Range: 12 - 45 U/L 20    ALT(SGPT) Latest Ref Range: 2 - 50 U/L 13    Alkaline Phosphatase Latest Ref Range: 30 - 99 U/L 150 (H)    Total Bilirubin Latest Ref Range: 0.1 - 1.5 mg/dL 0.7    Albumin Latest Ref Range: 3.2 - 4.9 g/dL 4.4    Total Protein Latest Ref Range: 6.0 - 8.2 g/dL 7.1    Globulin Latest Ref Range: 1.9 - 3.5 g/dL 2.7    A-G Ratio Latest Units: g/dL 1.6    Lipase Latest Ref Range: 11 - 82 U/L 28    PT  Latest Ref Range: 12.0 - 14.6 sec  13.1   INR Latest Ref Range: 0.87 - 1.13   0.96     Radiology:    Review of imaging obtained at [facility]:  Interventional Radiology procedure August 30, 2017:  Thoracic kyphoplasty at T12 with biplane fluoroscopic guidance for an osteoporotic insufficiency compression fracture.    CT procedure August 22, 2017:  CT-guided BILATERAL sacroplasty    MRI thoracic spine August 16, 2017:  1.  Subacute T12 vertebral body compression fracture  2.  Mild thoracic spondylosis    MRI lumbar spine August 16, 2017:  1.  Transitional morphology of the designated L5 segment  2.  Subacute T12 vertebral compression fracture with 25-50% height loss but no retropulsed material  3.  Subacute sacral insufficiency fractures extending through S2-S3 and the BILATERAL sacral ala  4.  Multilevel multifactorial degenerative changes  5.  Central canal narrowing worst at L3-L4  6.  Areas of neural foraminal narrowing as described    MRI lumbar spine July 12, 2017:  1.  Acute or recent subacute sacral insufficiency fracture at the S2-S3 segments.  2.  Multilevel degenerative disc disease and spondylotic changes most notable for L3-L4 mild-moderate central stenosis.  3.  Additional degenerative and spondylotic changes as detailed for each level above in the body of report.     CT abdomen pelvis August 9, 2017:  1.  No secondary signs of acute appendicitis, however the appendix is not visualized.  2.  Colonic diverticulosis without evidence for diverticulitis.  3.  No focal mesenteric inflammatory process.  4.  Stable cystic lesions in the liver and pancreas, of doubtful clinical significance.  5.  Probable subacute to chronic sacral insufficiency fracture.  6.  Probable subacute T12 vertebral compression fracture.     DEXA scan May 8, 2015:  According to the World Health Organization classification, bone mineral density of this patient is osteoporotic.  10-year Probability of Fracture:  Major Osteoporotic      37.9%  Hip     27.3%  Population      [place] ()  Based on left femur neck BMD    Current Outpatient Prescriptions   Medication Sig Dispense Refill   • zolpidem (AMBIEN) 5 MG Tab Take 1 Tab by mouth at bedtime as needed for Sleep. 30 Tab 2   • oxycodone-acetaminophen (PERCOCET) 5-325 MG Tab Take 1-2 Tabs by mouth every 8 hours as needed for Severe Pain. 30 Tab 0   • ondansetron (ZOFRAN) 4 MG Tab tablet Take 1 Tab by mouth every four hours as needed for Nausea/Vomiting. 30 Tab 1   • sertraline (ZOLOFT) 100 MG Tab Take 150 mg by mouth every day.     • azelastine (OPTIVAR) 0.05 % ophthalmic solution Place 1 Drop in both eyes every day.     • benazepril (LOTENSIN) 40 MG tablet Take 1 Tab by mouth every day. Indications: High Blood Pressure 90 Tab 3   • SYNTHROID 88 MCG Tab Take 1 Tab by mouth Every morning on an empty stomach. 90 Tab 3   • furosemide (LASIX) 20 MG Tab Take 1 Tab by mouth every day. Indications: High Blood Pressure 90 Tab 2   • atorvastatin (LIPITOR) 10 MG Tab Take 1 Tab by mouth every day. Indications: high cholesterol 90 Tab 3   • alprazolam (XANAX) 0.5 MG Tab Take 1 Tab by mouth at bedtime as needed for Sleep. (Patient taking differently: Take 0.25 mg by mouth at bedtime as needed for Sleep.) 90 Tab 1   • calcium carbonate (CALCIUM CARBONATE) 500 MG Tab Take 500 mg by mouth 2 times a day, with meals.     • magnesium chloride SR (SLO-MAG) 535 (64 MG) MG Tab CR Take 535 mg by mouth every day.     • polyethylene glycol/lytes (MIRALAX) PACK Take 17 g by mouth every day.     • therapeutic multivitamin-minerals (THERAGRAN-M) TABS Take 1 Tab by mouth every day.     • Cholecalciferol (VITAMIN D) 2000 UNITS CAPS Take 1 Cap by mouth every day.     • Probiotic Product (PROBIOTIC FORMULA PO) Take 1 Tab by mouth every day.     • cetirizine (ZYRTEC) 10 MG TABS Take 10 mg by mouth every day.       No current facility-administered medications for this encounter.        No Known Allergies    Physical  Exam:  General Appearance: healthy, alert, no distress, cooperative  Skin: Skin color, texture, turgor normal. No rashes or lesions. Well healed incision sites without bruising or erythema.  Back: There is no longer focal pain with percussion of the vertebral body at T12. No lumbar brace in place.   Lungs: negative findings: normal respiratory rate and rhythm  Extremities: negative findings: strength normal, 5/5 bilateral lower extremities     Impression:    1. Acute T12 compression fracture with delayed healing, status post kyphoplasty.  2. Acute sacral insufficiency fractures with delayed healing, status post sacroplasty.  3. Osteoporosis, spine.  4. Low back pain, improved.  5. Pelvic pain, improved.  6. Hypertension.  7. Asthma.  8. Hypothyroid.    Plan:   Artemio Fagan MD has reviewed 's history and imaging studies, examined the patient, and discussed treatment options.  We discussed the method of the procedure at length and reviewed imaging studies. Her pain has greatly improved after both procedures. All questions were answered. We discussed the possibility of future compression fractures and encouraged her to continue using her cane consistently.     SUZE Phillips with Artemio Fagan MD  Interventional Radiology   [place] [id number] [facility] ([id number])  [place] [id number]  [phone]

## 2017-10-24 ENCOUNTER — OFFICE VISIT (OUTPATIENT)
Dept: MEDICAL GROUP | Facility: MEDICAL CENTER | Age: 77
End: 2017-10-24
Payer: MEDICARE

## 2017-10-24 VITALS
OXYGEN SATURATION: 97 % | BODY MASS INDEX: 24.66 KG/M2 | RESPIRATION RATE: 14 BRPM | HEIGHT: 65 IN | WEIGHT: 148 LBS | SYSTOLIC BLOOD PRESSURE: 122 MMHG | HEART RATE: 78 BPM | TEMPERATURE: 98.6 F | DIASTOLIC BLOOD PRESSURE: 62 MMHG

## 2017-10-24 DIAGNOSIS — R53.83 CAREGIVER WITH FATIGUE: ICD-10-CM

## 2017-10-24 DIAGNOSIS — G47.00 INSOMNIA, UNSPECIFIED TYPE: ICD-10-CM

## 2017-10-24 DIAGNOSIS — R04.0 EPISTAXIS: ICD-10-CM

## 2017-10-24 DIAGNOSIS — S32.10XD CLOSED FRACTURE OF SACRUM WITH ROUTINE HEALING, UNSPECIFIED PORTION OF SACRUM, SUBSEQUENT ENCOUNTER: ICD-10-CM

## 2017-10-24 PROCEDURE — 99214 OFFICE O/P EST MOD 30 MIN: CPT | Performed by: FAMILY MEDICINE

## 2017-10-24 RX ORDER — ALPRAZOLAM 0.25 MG/1
0.25 TABLET ORAL NIGHTLY PRN
Qty: 90 TAB | Refills: 0 | Status: SHIPPED | OUTPATIENT
Start: 2017-10-24 | End: 2017-12-15 | Stop reason: SDUPTHER

## 2017-10-24 ASSESSMENT — PATIENT HEALTH QUESTIONNAIRE - PHQ9: CLINICAL INTERPRETATION OF PHQ2 SCORE: 0

## 2017-10-24 NOTE — PROGRESS NOTES
cc: Nosebleeds    Subjective:     Janell Tinsley is a 77 y.o. female presentingTo discuss nosebleeds. For the past several weeks, she has had 4 episodes of left nostril nosebleed. It is quite heavy with clots at times. Does stop after a couple minutes with pressure. Denies any seasonal allergies or other nasal symptoms like congestion. Has been having more stress lately. Her  continues to decline in health, is currently on hospice. She is also trying to sell their house so that we typically can move to [place] to be closer to family. Her daughter lives in [place] who can help. She she notes that her back pain has improved after the multiple injections and kyphoplasty done with her back surgeon. She plans to see her monthly for continued injections. Is having some issues with insurance currently on coverage of these back interventions.    Review of systems:  See above.   She also complains of her scalp hair being more curly for the past month and a half, which is      Current Outpatient Prescriptions:   •  alprazolam (XANAX) 0.25 MG Tab, Take 1 Tab by mouth at bedtime as needed for Sleep., Disp: 90 Tab, Rfl: 0  •  zolpidem (AMBIEN) 5 MG Tab, Take 1 Tab by mouth at bedtime as needed for Sleep., Disp: 30 Tab, Rfl: 2  •  oxycodone-acetaminophen (PERCOCET) 5-325 MG Tab, Take 1-2 Tabs by mouth every 8 hours as needed for Severe Pain., Disp: 30 Tab, Rfl: 0  •  ondansetron (ZOFRAN) 4 MG Tab tablet, Take 1 Tab by mouth every four hours as needed for Nausea/Vomiting., Disp: 30 Tab, Rfl: 1  •  sertraline (ZOLOFT) 100 MG Tab, Take 150 mg by mouth every day., Disp: , Rfl:   •  azelastine (OPTIVAR) 0.05 % ophthalmic solution, Place 1 Drop in both eyes every day., Disp: , Rfl:   •  benazepril (LOTENSIN) 40 MG tablet, Take 1 Tab by mouth every day. Indications: High Blood Pressure, Disp: 90 Tab, Rfl: 3  •  SYNTHROID 88 MCG Tab, Take 1 Tab by mouth Every morning on an empty stomach., Disp: 90 Tab, Rfl: 3  •  furosemide  "(LASIX) 20 MG Tab, Take 1 Tab by mouth every day. Indications: High Blood Pressure, Disp: 90 Tab, Rfl: 2  •  atorvastatin (LIPITOR) 10 MG Tab, Take 1 Tab by mouth every day. Indications: high cholesterol, Disp: 90 Tab, Rfl: 3  •  calcium carbonate (CALCIUM CARBONATE) 500 MG Tab, Take 500 mg by mouth 2 times a day, with meals., Disp: , Rfl:   •  magnesium chloride SR (SLO-MAG) 535 (64 MG) MG Tab CR, Take 535 mg by mouth every day., Disp: , Rfl:   •  polyethylene glycol/lytes (MIRALAX) PACK, Take 17 g by mouth every day., Disp: , Rfl:   •  therapeutic multivitamin-minerals (THERAGRAN-M) TABS, Take 1 Tab by mouth every day., Disp: , Rfl:   •  Cholecalciferol (VITAMIN D) 2000 UNITS CAPS, Take 1 Cap by mouth every day., Disp: , Rfl:   •  Probiotic Product (PROBIOTIC FORMULA PO), Take 1 Tab by mouth every day., Disp: , Rfl:   •  cetirizine (ZYRTEC) 10 MG TABS, Take 10 mg by mouth every day., Disp: , Rfl:     No Known Allergies    Past Medical History:   Diagnosis Date   • Arthritis 7/25/2014    S/P bilateral meniscectomies R knee pain > L    • ASTHMA     \"seasonal\"   • Back pain 7/25/2014    S/p epidural L4 radiculopathy Completed PT    • CAD (coronary artery disease)     medicated   • Compression fracture of T12 vertebra with delayed healing 2017   • History of colonic diverticulitis 1/30/2013    2 episodes in past year 2013-14    • Hyperlipemia 7/25/2014   • Hypertension     medicated   • Infectious disease     MRSA   • Insomnia 7/25/2014   • Pain of left heel 1/13/2015   • Sacral insufficiency fracture with delayed healing 2017   • Thyroid activity decreased     medicated     Past Surgical History:   Procedure Laterality Date   • GASTROSCOPY WITH BIOPSY  10/22/2012    Performed by Santosh Medina M.D. at [facility]   • EGD WITH ASP/BX  10/22/2012    Performed by Santosh Medina M.D. at [facility]   • KNEE ARTHROSCOPY  8/5/2010    Performed by RINA MONTESINOS at SURGERY [facility]" "DAY [facility] ORS   • MEDIAL MENISCECTOMY  8/5/2010    Performed by RINA MONTESINOS at SURGERY SAME DAY [facility] ORS   • MENISCECTOMY  8/5/2010    Performed by RINA MONTESINOS at SURGERY SAME DAY [facility] ORS   • OTHER  1970    laparotomy removed fibroid   • ABDOMINAL HYSTERECTOMY TOTAL     • GYN SURGERY      hysterectomy   • OTHER      tonsillectomy   • OTHER ABDOMINAL SURGERY      appy   • OTHER ORTHOPEDIC SURGERY      right shoulder rotater cuff     Family History   Problem Relation Age of Onset   • Arthritis Mother    • Cancer Mother      cervical   • Alcohol abuse Mother    • Heart Disease Father    • Alcohol abuse Step-Father    • Diabetes     • Hypertension     • Stroke     • Cancer       Social History     Social History   • Marital status:      Spouse name: N/A   • Number of children: N/A   • Years of education: N/A     Occupational History   • Not on file.     Social History Main Topics   • Smoking status: Never Smoker   • Smokeless tobacco: Never Used   • Alcohol use No   • Drug use: No   • Sexual activity: Yes     Partners: Male     Other Topics Concern   • Not on file     Social History Narrative    Former  of a company in [place].   with lewy body dementia, she is the primary caregiver       Objective:     Vitals: /62   Pulse 78   Temp 37 °C (98.6 °F)   Resp 14   Ht 1.651 m (5' 5\")   Wt 67.1 kg (148 lb)   SpO2 97%   BMI 24.63 kg/m²   General: Alert, pleasant, NAD, anxious  HEENT: Normocephalic.  PERRL, EOMI, no icterus or pallor.  Conjunctivae and lids normal. External ears normal.  Oropharynx non-erythematous, mucous membranes moist, no lesions.  Neck supple.  No thyromegaly or masses palpated. No cervical or supraclavicular lymphadenopathy.  Psych:  Affect/mood is normal, judgement is good, memory is intact, grooming is appropriate.    Assessment/Plan:     Diagnoses and all orders for this visit:    Epistaxis  No abnormality seen on exam today. Discussed management " of future episodes of epistaxis.    Caregiver with fatigue  Offered support    Insomnia, unspecified type  Stable, she continues to use alprazolam 0.125 mg at night in addition to Ambien 5 mg at night. She understands the risks of combining these medications  -     alprazolam (XANAX) 0.25 MG Tab; Take 1 Tab by mouth at bedtime as needed for Sleep.    Closed fracture of sacrum with routine healing, unspecified portion of sacrum, subsequent encounter  Stable, followed by neurosurgery

## 2017-12-06 ENCOUNTER — APPOINTMENT (OUTPATIENT)
Dept: RADIOLOGY | Facility: MEDICAL CENTER | Age: 77
End: 2017-12-06
Attending: PHYSICAL MEDICINE & REHABILITATION
Payer: MEDICARE

## 2017-12-06 DIAGNOSIS — M54.2 CERVICALGIA: ICD-10-CM

## 2017-12-06 DIAGNOSIS — M54.5 LOW BACK PAIN, UNSPECIFIED BACK PAIN LATERALITY, UNSPECIFIED CHRONICITY, WITH SCIATICA PRESENCE UNSPECIFIED: ICD-10-CM

## 2017-12-06 PROCEDURE — 72141 MRI NECK SPINE W/O DYE: CPT

## 2017-12-06 PROCEDURE — 72148 MRI LUMBAR SPINE W/O DYE: CPT

## 2017-12-15 ENCOUNTER — OFFICE VISIT (OUTPATIENT)
Dept: MEDICAL GROUP | Facility: MEDICAL CENTER | Age: 77
End: 2017-12-15
Payer: MEDICARE

## 2017-12-15 VITALS
BODY MASS INDEX: 24.83 KG/M2 | HEART RATE: 74 BPM | OXYGEN SATURATION: 95 % | SYSTOLIC BLOOD PRESSURE: 130 MMHG | WEIGHT: 149 LBS | DIASTOLIC BLOOD PRESSURE: 76 MMHG | RESPIRATION RATE: 14 BRPM | HEIGHT: 65 IN | TEMPERATURE: 98.6 F

## 2017-12-15 DIAGNOSIS — F43.23 SITUATIONAL MIXED ANXIETY AND DEPRESSIVE DISORDER: ICD-10-CM

## 2017-12-15 DIAGNOSIS — G47.00 INSOMNIA, UNSPECIFIED TYPE: ICD-10-CM

## 2017-12-15 DIAGNOSIS — R53.83 CAREGIVER WITH FATIGUE: ICD-10-CM

## 2017-12-15 PROCEDURE — 99214 OFFICE O/P EST MOD 30 MIN: CPT | Performed by: FAMILY MEDICINE

## 2017-12-15 RX ORDER — FLUOXETINE HYDROCHLORIDE 20 MG/1
20 CAPSULE ORAL DAILY
Qty: 90 CAP | Refills: 0 | Status: SHIPPED | OUTPATIENT
Start: 2017-12-15 | End: 2018-01-09

## 2017-12-15 RX ORDER — ALPRAZOLAM 0.25 MG/1
0.25 TABLET ORAL NIGHTLY PRN
Qty: 90 TAB | Refills: 0 | Status: SHIPPED | OUTPATIENT
Start: 2017-12-15 | End: 2018-03-15

## 2017-12-15 RX ORDER — ZOLPIDEM TARTRATE 5 MG/1
5 TABLET ORAL NIGHTLY PRN
Qty: 90 TAB | Refills: 0 | Status: SHIPPED | OUTPATIENT
Start: 2017-12-15 | End: 2018-03-15

## 2017-12-15 NOTE — PROGRESS NOTES
cc: Depression    Subjective:     Janell Tinsley is a 77 y.o. female presentingFor follow-up of her depression. She has been taking sertraline 150 mg daily for several months. She denies feeling depressed, but feels tired all the time, has poor concentration, psychomotor agitation, poor sleep. She is wondering if she can switch to Prozac. Her daughter recently started Prozac and seemed to do quite well on this. She continues to have a lot of stress at home. Her  continues to decline with his memory symptoms. She continues to care for him, does have help from hospice. She continues to try to sell her home, possibly has a prior semen. She has also developed neck stiffness, is doing physical therapy and trigger point injections with her back specialist. Her compression fractures appear to be healing well, but she continues to have intermittent back pain. Is only taking Tylenol for this. Has stopped the opioid medications.    Review of systems:  See above.        Current Outpatient Prescriptions:   •  zolpidem (AMBIEN) 5 MG Tab, Take 1 Tab by mouth at bedtime as needed for Sleep for up to 90 days., Disp: 90 Tab, Rfl: 0  •  alprazolam (XANAX) 0.25 MG Tab, Take 1 Tab by mouth at bedtime as needed for Sleep for up to 90 days., Disp: 90 Tab, Rfl: 0  •  fluoxetine (PROZAC) 20 MG Cap, Take 1 Cap by mouth every day., Disp: 90 Cap, Rfl: 0  •  ondansetron (ZOFRAN) 4 MG Tab tablet, Take 1 Tab by mouth every four hours as needed for Nausea/Vomiting., Disp: 30 Tab, Rfl: 1  •  sertraline (ZOLOFT) 100 MG Tab, Take 150 mg by mouth every day., Disp: , Rfl:   •  azelastine (OPTIVAR) 0.05 % ophthalmic solution, Place 1 Drop in both eyes every day., Disp: , Rfl:   •  benazepril (LOTENSIN) 40 MG tablet, Take 1 Tab by mouth every day. Indications: High Blood Pressure, Disp: 90 Tab, Rfl: 3  •  SYNTHROID 88 MCG Tab, Take 1 Tab by mouth Every morning on an empty stomach., Disp: 90 Tab, Rfl: 3  •  furosemide (LASIX) 20 MG Tab, Take 1  "Tab by mouth every day. Indications: High Blood Pressure, Disp: 90 Tab, Rfl: 2  •  atorvastatin (LIPITOR) 10 MG Tab, Take 1 Tab by mouth every day. Indications: high cholesterol, Disp: 90 Tab, Rfl: 3  •  calcium carbonate (CALCIUM CARBONATE) 500 MG Tab, Take 500 mg by mouth 2 times a day, with meals., Disp: , Rfl:   •  magnesium chloride SR (SLO-MAG) 535 (64 MG) MG Tab CR, Take 535 mg by mouth every day., Disp: , Rfl:   •  polyethylene glycol/lytes (MIRALAX) PACK, Take 17 g by mouth every day., Disp: , Rfl:   •  therapeutic multivitamin-minerals (THERAGRAN-M) TABS, Take 1 Tab by mouth every day., Disp: , Rfl:   •  Cholecalciferol (VITAMIN D) 2000 UNITS CAPS, Take 1 Cap by mouth every day., Disp: , Rfl:   •  Probiotic Product (PROBIOTIC FORMULA PO), Take 1 Tab by mouth every day., Disp: , Rfl:   •  cetirizine (ZYRTEC) 10 MG TABS, Take 10 mg by mouth every day., Disp: , Rfl:     No Known Allergies    Past Medical History:   Diagnosis Date   • Arthritis 7/25/2014    S/P bilateral meniscectomies R knee pain > L    • ASTHMA     \"seasonal\"   • Back pain 7/25/2014    S/p epidural L4 radiculopathy Completed PT    • CAD (coronary artery disease)     medicated   • Compression fracture of T12 vertebra with delayed healing 2017   • History of colonic diverticulitis 1/30/2013    2 episodes in past year 2013-14    • Hyperlipemia 7/25/2014   • Hypertension     medicated   • Infectious disease     MRSA   • Insomnia 7/25/2014   • Pain of left heel 1/13/2015   • Sacral insufficiency fracture with delayed healing 2017   • Thyroid activity decreased     medicated     Past Surgical History:   Procedure Laterality Date   • GASTROSCOPY WITH BIOPSY  10/22/2012    Performed by Santosh Medina M.D. at SURGERY [facility] ORS   • EGD WITH ASP/BX  10/22/2012    Performed by Santosh Medina M.D. at [facility] ORS   • KNEE ARTHROSCOPY  8/5/2010    Performed by RINA MONTESINOS at SURGERY SAME DAY [facility] ORS   • MEDIAL " "MENISCECTOMY  8/5/2010    Performed by RINA MONTESINOS at SURGERY SAME DAY [facility] ORS   • MENISCECTOMY  8/5/2010    Performed by RINA MONTESINOS at SURGERY SAME DAY [facility] ORS   • OTHER  1970    laparotomy removed fibroid   • ABDOMINAL HYSTERECTOMY TOTAL     • GYN SURGERY      hysterectomy   • OTHER      tonsillectomy   • OTHER ABDOMINAL SURGERY      appy   • OTHER ORTHOPEDIC SURGERY      right shoulder rotater cuff     Family History   Problem Relation Age of Onset   • Arthritis Mother    • Cancer Mother      cervical   • Alcohol abuse Mother    • Heart Disease Father    • Alcohol abuse Step-Father    • Diabetes     • Hypertension     • Stroke     • Cancer       Social History     Social History   • Marital status:      Spouse name: N/A   • Number of children: N/A   • Years of education: N/A     Occupational History   • Not on file.     Social History Main Topics   • Smoking status: Never Smoker   • Smokeless tobacco: Never Used   • Alcohol use No   • Drug use: No   • Sexual activity: Yes     Partners: Male     Other Topics Concern   • Not on file     Social History Narrative    Former  of a company in [place].   with lewy body dementia, she is the primary caregiver       Objective:     Vitals: /76   Pulse 74   Temp 37 °C (98.6 °F)   Resp 14   Ht 1.651 m (5' 5\")   Wt 67.6 kg (149 lb)   SpO2 95%   BMI 24.79 kg/m²   General: Alert, pleasant, NAD  HEENT: Normocephalic.   Psych:  Affect/mood is normal, judgement is good, memory is intact, grooming is appropriate.    Assessment/Plan:     Janell was seen today for follow-up.    Diagnoses and all orders for this visit:    Insomnia, unspecified type  She continues to take Ambien 5 mg nightly and Xanax intermittently. She has tried multiple different medications for sleep, and these seem to work best for her. She understands the risks of combining these medications.  -     zolpidem (AMBIEN) 5 MG Tab; Take 1 Tab by mouth at bedtime as " needed for Sleep for up to 90 days.  -     alprazolam (XANAX) 0.25 MG Tab; Take 1 Tab by mouth at bedtime as needed for Sleep for up to 90 days.    Situational mixed anxiety and depressive disorder  Caregiver with fatigue  Discussed pros and cons of switching to Prozac. Patient would like to think about this some more. If she does decide to start the Prozac, she will taper off the sertraline to 100 mg daily for 5-7 days, then 50 mg daily for 5-7 days, then stop  -     fluoxetine (PROZAC) 20 MG Cap; Take 1 Cap by mouth every day.        Return in about 2 months (around 2/15/2018), or if symptoms worsen or fail to improve.

## 2018-01-09 ENCOUNTER — TELEPHONE (OUTPATIENT)
Dept: MEDICAL GROUP | Facility: MEDICAL CENTER | Age: 78
End: 2018-01-09

## 2018-01-09 RX ORDER — FLUOXETINE HYDROCHLORIDE 20 MG/1
40 CAPSULE ORAL DAILY
Qty: 90 CAP | Refills: 0 | COMMUNITY
Start: 2018-01-09 | End: 2018-03-08 | Stop reason: SDUPTHER

## 2018-01-10 NOTE — TELEPHONE ENCOUNTER
Patient calling to report that she has been taking the Prozac 20 mg daily and has stopped the sertraline. She has been on this medication for about a week and a half. Hasn't noticed any difference in her mood. She was having headaches initially, but they seem to have subsided. is wondering if she can increase the dose. She also reports an episode of dizziness. She was turning too quickly and ended up falling. She threw up once after she fell, but otherwise feels normal. She has a bruise on her forehead when she hit her head. No loss of consciousness. She wanted to get an evaluation, but has no time for this. She is moving with her  who has dementia in a week and a half. Is moving to [place] to be closer to family. She is also trying to sell her home currently, has quite a lot of stress.      Plan: Will increase Prozac to 40 mg daily as she seems to be tolerating it well. She had questions about her medications after she moves, she will let us know which pharmacy she plans to use after her move, will send in prescriptions to her pharmacy until she can establish with a new provider in [place].  Discussed reasons to go to the ER given the fall.

## 2018-01-11 ENCOUNTER — OFFICE VISIT (OUTPATIENT)
Dept: MEDICAL GROUP | Facility: MEDICAL CENTER | Age: 78
End: 2018-01-11
Payer: MEDICARE

## 2018-01-11 VITALS
RESPIRATION RATE: 16 BRPM | WEIGHT: 149 LBS | DIASTOLIC BLOOD PRESSURE: 80 MMHG | HEART RATE: 90 BPM | HEIGHT: 65 IN | TEMPERATURE: 98.6 F | SYSTOLIC BLOOD PRESSURE: 116 MMHG | OXYGEN SATURATION: 95 % | BODY MASS INDEX: 24.83 KG/M2

## 2018-01-11 DIAGNOSIS — S09.90XA INJURY OF HEAD, INITIAL ENCOUNTER: ICD-10-CM

## 2018-01-11 PROCEDURE — 99214 OFFICE O/P EST MOD 30 MIN: CPT | Performed by: FAMILY MEDICINE

## 2018-01-12 ENCOUNTER — HOSPITAL ENCOUNTER (OUTPATIENT)
Dept: RADIOLOGY | Facility: MEDICAL CENTER | Age: 78
End: 2018-01-12
Attending: FAMILY MEDICINE
Payer: MEDICARE

## 2018-01-12 DIAGNOSIS — S09.90XA TRAUMATIC INJURY OF HEAD, INITIAL ENCOUNTER: ICD-10-CM

## 2018-01-12 DIAGNOSIS — S06.0X0A CONCUSSION WITHOUT LOSS OF CONSCIOUSNESS, INITIAL ENCOUNTER: ICD-10-CM

## 2018-01-12 PROCEDURE — 70450 CT HEAD/BRAIN W/O DYE: CPT

## 2018-01-12 NOTE — PROGRESS NOTES
CC: Head injury as a result of a fall    HPI:   Janell presents today because she tripped and fell down and hit her head above the left eye 5 days ago. The area above the eyes was red and black, but now resolved. She did not seek medical advised at that time but when she visited her husbands eye doctor, he told her that she need a scan because she might have brain bleed. Currently he mild headache that is always responding to tylenol, denies nausea vomiting, dizziness. Denies a new balance issue since the fall.       Patient Active Problem List    Diagnosis Date Noted   • T12 compression fracture (CMS-HCC) 08/22/2017   • Closed fracture of sacrum with routine healing 08/08/2017   • Acquired hypothyroidism 06/08/2017   • Caregiver with fatigue 09/07/2016   • Situational mixed anxiety and depressive disorder 12/04/2015   • MEDICAL HOME 05/22/2015   • Seasonal asthma 04/27/2015   • Osteoporosis 01/13/2015   • Hyperlipemia 07/25/2014   • Insomnia 07/25/2014   • Essential hypertension 01/31/2013   • Pancreatic cyst 10/22/2012       Current Outpatient Prescriptions   Medication Sig Dispense Refill   • fluoxetine (PROZAC) 20 MG Cap Take 2 Caps by mouth every day. 90 Cap 0   • zolpidem (AMBIEN) 5 MG Tab Take 1 Tab by mouth at bedtime as needed for Sleep for up to 90 days. 90 Tab 0   • alprazolam (XANAX) 0.25 MG Tab Take 1 Tab by mouth at bedtime as needed for Sleep for up to 90 days. 90 Tab 0   • ondansetron (ZOFRAN) 4 MG Tab tablet Take 1 Tab by mouth every four hours as needed for Nausea/Vomiting. 30 Tab 1   • azelastine (OPTIVAR) 0.05 % ophthalmic solution Place 1 Drop in both eyes every day.     • benazepril (LOTENSIN) 40 MG tablet Take 1 Tab by mouth every day. Indications: High Blood Pressure 90 Tab 3   • SYNTHROID 88 MCG Tab Take 1 Tab by mouth Every morning on an empty stomach. 90 Tab 3   • furosemide (LASIX) 20 MG Tab Take 1 Tab by mouth every day. Indications: High Blood Pressure 90 Tab 2   • atorvastatin  "(LIPITOR) 10 MG Tab Take 1 Tab by mouth every day. Indications: high cholesterol 90 Tab 3   • calcium carbonate (CALCIUM CARBONATE) 500 MG Tab Take 500 mg by mouth 2 times a day, with meals.     • magnesium chloride SR (SLO-MAG) 535 (64 MG) MG Tab CR Take 535 mg by mouth every day.     • polyethylene glycol/lytes (MIRALAX) PACK Take 17 g by mouth every day.     • therapeutic multivitamin-minerals (THERAGRAN-M) TABS Take 1 Tab by mouth every day.     • Probiotic Product (PROBIOTIC FORMULA PO) Take 1 Tab by mouth every day.     • cetirizine (ZYRTEC) 10 MG TABS Take 10 mg by mouth every day.     • Cholecalciferol (VITAMIN D) 2000 UNITS CAPS Take 1 Cap by mouth every day.       No current facility-administered medications for this visit.          Allergies as of 01/11/2018   • (No Known Allergies)        ROS: Denies any chest pain, Shortness of breath, Changes bowel or bladder, Lower extremity edema.    Physical Exam:  /80   Pulse 90   Temp 37 °C (98.6 °F)   Resp 16   Ht 1.651 m (5' 5\")   Wt 67.6 kg (149 lb)   SpO2 95%   BMI 24.79 kg/m²   Gen.: Well-developed, well-nourished, no apparent distress,pleasant and cooperative with the examination  HEENT:Sinuses nontender with palpation, TMs clear, nares patent with pink mucosa and clear rhinorrhea,no septal deviation ,polyps or lesions. lips without lesions, oropharynx clear.Mild tenderness oin the forehead above the left eye, skin over it is normal , both eyes are normal  ( PERRLA)  Neck: Trachea midline,no masses or adenopathy. No JVD.  Cor: Regular rate and rhythm without murmur, gallop or rub.          Assessment and Plan.   77 y.o. female     1. Injury of head, initial encounter  R/O SDH, however unlikely. Her pain has gotten better, normal vision, no dizziness , has mild headache, no nausea or vomiting.  Patient is concerned about brain bleed.    - CT-HEAD W/O; Future      "

## 2018-01-17 ENCOUNTER — APPOINTMENT (OUTPATIENT)
Dept: RADIOLOGY | Facility: IMAGING CENTER | Age: 78
End: 2018-01-17
Attending: PHYSICIAN ASSISTANT
Payer: MEDICARE

## 2018-01-17 ENCOUNTER — OFFICE VISIT (OUTPATIENT)
Dept: URGENT CARE | Facility: CLINIC | Age: 78
End: 2018-01-17
Payer: MEDICARE

## 2018-01-17 VITALS
HEIGHT: 65 IN | RESPIRATION RATE: 18 BRPM | OXYGEN SATURATION: 97 % | BODY MASS INDEX: 24.83 KG/M2 | SYSTOLIC BLOOD PRESSURE: 122 MMHG | HEART RATE: 90 BPM | WEIGHT: 149 LBS | DIASTOLIC BLOOD PRESSURE: 80 MMHG | TEMPERATURE: 97.8 F
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
## 2018-01-17 DIAGNOSIS — S32.010A CLOSED COMPRESSION FRACTURE OF FIRST LUMBAR VERTEBRA, INITIAL ENCOUNTER: Primary | ICD-10-CM

## 2018-01-17 DIAGNOSIS — M25.552 PELVIC JOINT PAIN, LEFT: ICD-10-CM

## 2018-01-17 DIAGNOSIS — M54.50 LUMBOSACRAL PAIN: ICD-10-CM

## 2018-01-17 PROCEDURE — 72100 X-RAY EXAM L-S SPINE 2/3 VWS: CPT | Mod: TC | Performed by: PHYSICIAN ASSISTANT

## 2018-01-17 PROCEDURE — 99213 OFFICE O/P EST LOW 20 MIN: CPT | Performed by: PHYSICIAN ASSISTANT

## 2018-01-17 PROCEDURE — 73501 X-RAY EXAM HIP UNI 1 VIEW: CPT | Mod: 26,LT | Performed by: PHYSICIAN ASSISTANT

## 2018-01-17 RX ORDER — HYDROCODONE BITARTRATE AND ACETAMINOPHEN 5; 325 MG/1; MG/1
1-2 TABLET ORAL EVERY 4 HOURS PRN
Qty: 20 TAB | Refills: 0 | Status: CANCELLED | OUTPATIENT
Start: 2018-01-17 | End: 2018-01-20

## 2018-01-17 RX ORDER — KETOROLAC TROMETHAMINE 30 MG/ML
60 INJECTION, SOLUTION INTRAMUSCULAR; INTRAVENOUS ONCE
Status: COMPLETED | OUTPATIENT
Start: 2018-01-17 | End: 2018-01-17

## 2018-01-17 RX ADMIN — KETOROLAC TROMETHAMINE 60 MG: 30 INJECTION, SOLUTION INTRAMUSCULAR; INTRAVENOUS at 14:22

## 2018-01-17 NOTE — PROGRESS NOTES
"Subjective:      Pt is a 77 y.o. female who presents with Back Pain (pt fell on her back X today )            HPI  Pt notes she fell on her back x 3 today in her garage and notes hx of kyphoplasty last August for T12 compression fx. Pt notes new lumbar pain and states pain is 9-10/10, aching and throbbing in nature and worse \"right now\". Pt has taken her home pain mgt but notes continued pain. Pt is concerned for anew fx. Pt states she is leaving/moving for [place] tomorrow permanently. Pt denies CP, SOB, NVD, paresthesias, headaches, dizziness, change in vision, hives, or other joint pain. The pt's medication list, problem list, and allergies have been evaluated and reviewed during today's visit.    PMH:  Past Medical History:   Diagnosis Date   • Arthritis 7/25/2014    S/P bilateral meniscectomies R knee pain > L    • ASTHMA     \"seasonal\"   • Back pain 7/25/2014    S/p epidural L4 radiculopathy Completed PT    • CAD (coronary artery disease)     medicated   • Compression fracture of T12 vertebra with delayed healing 2017   • History of colonic diverticulitis 1/30/2013    2 episodes in past year 2013-14    • Hyperlipemia 7/25/2014   • Hypertension     medicated   • Infectious disease     MRSA   • Insomnia 7/25/2014   • Pain of left heel 1/13/2015   • Sacral insufficiency fracture with delayed healing 2017   • Thyroid activity decreased     medicated       PSH:  Past Surgical History:   Procedure Laterality Date   • GASTROSCOPY WITH BIOPSY  10/22/2012    Performed by Santosh Medina M.D. at SURGERY [facility] ORS   • EGD WITH ASP/BX  10/22/2012    Performed by Santosh Medina M.D. at [facility] ORS   • KNEE ARTHROSCOPY  8/5/2010    Performed by RINA MONTESINOS at SURGERY SAME DAY [facility] ORS   • MEDIAL MENISCECTOMY  8/5/2010    Performed by RINA MONTESINOS at SURGERY SAME DAY [facility] ORS   • MENISCECTOMY  8/5/2010    Performed by RINA MONTESINOS at SURGERY SAME DAY [facility] ORS   • OTHER "  1970    laparotomy removed fibroid   • ABDOMINAL HYSTERECTOMY TOTAL     • GYN SURGERY      hysterectomy   • OTHER      tonsillectomy   • OTHER ABDOMINAL SURGERY      appy   • OTHER ORTHOPEDIC SURGERY      right shoulder rotater cuff       Fam Hx:    family history includes Alcohol abuse in her mother and step-father; Arthritis in her mother; Cancer in her mother; Heart Disease in her father.  Family Status   Relation Status   • Mother  at age 93   • Father  at age 90s   • Brother Alive   • Daughter Alive   • Grandchild Alive   • Son Alive   • Step-Father    •         Soc HX:  Social History     Social History   • Marital status:      Spouse name: N/A   • Number of children: N/A   • Years of education: N/A     Occupational History   • Not on file.     Social History Main Topics   • Smoking status: Never Smoker   • Smokeless tobacco: Never Used   • Alcohol use No   • Drug use: No   • Sexual activity: Yes     Partners: Male     Other Topics Concern   • Not on file     Social History Narrative    Former  of a company in [place].   with lewy body dementia, she is the primary caregiver         Medications:    Current Outpatient Prescriptions:   •  fluoxetine (PROZAC) 20 MG Cap, Take 2 Caps by mouth every day., Disp: 90 Cap, Rfl: 0  •  ondansetron (ZOFRAN) 4 MG Tab tablet, Take 1 Tab by mouth every four hours as needed for Nausea/Vomiting., Disp: 30 Tab, Rfl: 1  •  azelastine (OPTIVAR) 0.05 % ophthalmic solution, Place 1 Drop in both eyes every day., Disp: , Rfl:   •  benazepril (LOTENSIN) 40 MG tablet, Take 1 Tab by mouth every day. Indications: High Blood Pressure, Disp: 90 Tab, Rfl: 3  •  SYNTHROID 88 MCG Tab, Take 1 Tab by mouth Every morning on an empty stomach., Disp: 90 Tab, Rfl: 3  •  furosemide (LASIX) 20 MG Tab, Take 1 Tab by mouth every day. Indications: High Blood Pressure, Disp: 90 Tab, Rfl: 2  •  atorvastatin (LIPITOR) 10 MG Tab, Take 1 Tab by mouth every day.  "Indications: high cholesterol, Disp: 90 Tab, Rfl: 3  •  calcium carbonate (CALCIUM CARBONATE) 500 MG Tab, Take 500 mg by mouth 2 times a day, with meals., Disp: , Rfl:   •  magnesium chloride SR (SLO-MAG) 535 (64 MG) MG Tab CR, Take 535 mg by mouth every day., Disp: , Rfl:   •  polyethylene glycol/lytes (MIRALAX) PACK, Take 17 g by mouth every day., Disp: , Rfl:   •  therapeutic multivitamin-minerals (THERAGRAN-M) TABS, Take 1 Tab by mouth every day., Disp: , Rfl:   •  Cholecalciferol (VITAMIN D) 2000 UNITS CAPS, Take 1 Cap by mouth every day., Disp: , Rfl:   •  Probiotic Product (PROBIOTIC FORMULA PO), Take 1 Tab by mouth every day., Disp: , Rfl:   •  cetirizine (ZYRTEC) 10 MG TABS, Take 10 mg by mouth every day., Disp: , Rfl:   •  zolpidem (AMBIEN) 5 MG Tab, Take 1 Tab by mouth at bedtime as needed for Sleep for up to 90 days., Disp: 90 Tab, Rfl: 0  •  alprazolam (XANAX) 0.25 MG Tab, Take 1 Tab by mouth at bedtime as needed for Sleep for up to 90 days., Disp: 90 Tab, Rfl: 0      Allergies:  Patient has no known allergies.    ROS  Constitutional: Negative for fever, chills and malaise/fatigue.   HENT: Negative for congestion and sore throat.    Eyes: Negative for blurred vision, double vision and photophobia.   Respiratory: Negative for cough and shortness of breath.    Cardiovascular: Negative for chest pain and palpitations.   Gastrointestinal: Negative for heartburn, nausea, vomiting, abdominal pain, diarrhea and constipation.   Genitourinary: Negative for dysuria and flank pain.   Musculoskeletal: POS for lumbar pain and myalgias.   Skin: Negative for itching and rash.   Neurological: Negative for dizziness, tingling and headaches.   Endo/Heme/Allergies: Does not bruise/bleed easily.   Psychiatric/Behavioral: Negative for depression. The patient is not nervous/anxious.           Objective:     /80   Pulse 90   Temp 36.6 °C (97.8 °F)   Resp 18   Ht 1.651 m (5' 5\")   Wt 67.6 kg (149 lb)   SpO2 97%   " BMI 24.79 kg/m²      Physical Exam   Musculoskeletal:        Lumbar back: She exhibits decreased range of motion, tenderness, bony tenderness, pain and spasm. She exhibits no swelling, no edema, no deformity, no laceration and normal pulse.        Back:           Constitutional: PT is oriented to person, place, and time. PT appears well-developed and well-nourished. No distress.   HENT:   Head: Normocephalic and atraumatic.   Mouth/Throat: Oropharynx is clear and moist. No oropharyngeal exudate.   Eyes: Conjunctivae normal and EOM are normal. Pupils are equal, round, and reactive to light.   Neck: Normal range of motion. Neck supple. No thyromegaly present.   Cardiovascular: Normal rate, regular rhythm, normal heart sounds and intact distal pulses.  Exam reveals no gallop and no friction rub.    No murmur heard.  Pulmonary/Chest: Effort normal and breath sounds normal. No respiratory distress. PT has no wheezes. PT has no rales. Pt exhibits no tenderness.   Abdominal: Soft. Bowel sounds are normal. PT exhibits no distension and no mass. There is no tenderness. There is no rebound and no guarding.   Neurological: PT is alert and oriented to person, place, and time. PT has normal reflexes. No cranial nerve deficit.   Skin: Skin is warm and dry. No rash noted. PT is not diaphoretic. No erythema.       Psychiatric: PT has a normal mood and affect. PT behavior is normal. Judgment and thought content normal.        Assessment/Plan:     1. Closed compression fracture of first lumbar vertebra, initial encounter (CMS-HCC)      2. Lumbosacral pain    - DX-LUMBAR SPINE-2 OR 3 VIEWS; Future  - ketorolac (TORADOL) injection 60 mg; 2 mL by Intramuscular route Once.    3. Pelvic joint pain, left    - DX-HIP-UNILATERAL-WITH PELVIS-1 VIEW LEFT; Future  - ketorolac (TORADOL) injection 60 mg; 2 mL by Intramuscular route Once.    4. Fall at home, initial encounter    - DX-LUMBAR SPINE-2 OR 3 VIEWS; Future  - DX-HIP-UNILATERAL-WITH  PELVIS-1 VIEW LEFT; Future  - ketorolac (TORADOL) injection 60 mg; 2 mL by Intramuscular route Once.    Pt defers nausea medication in clinic  Spoke with pt's spine doctor on the phone,  and she concurred with medication mgt and for her to follow up after her move to [place] tomorrow with [place].  Pt has own pain meds at home and antinausea medication  Rest, fluids encouraged.  AVS with medical info given.  Pt was in full understanding and agreement with the plan.  Follow-up as needed if symptoms worsen or fail to improve.

## 2018-01-18 ENCOUNTER — DOCUMENTATION (OUTPATIENT)
Dept: MEDICAL GROUP | Facility: MEDICAL CENTER | Age: 78
End: 2018-01-18

## 2018-01-18 ENCOUNTER — TELEPHONE (OUTPATIENT)
Dept: URGENT CARE | Facility: CLINIC | Age: 78
End: 2018-01-18

## 2018-01-18 NOTE — PROGRESS NOTES
Patient called needing pain medication due to fall, advised patient she has to be seen to receive pain medication.

## 2018-01-18 NOTE — TELEPHONE ENCOUNTER
Pt states the pain is bad and she is refusing to go to ED . She wants more pain medication I explained to pt that we could not give any more pain meds due to she is on contract with pain management     Pt states understanding

## 2018-03-08 RX ORDER — FLUOXETINE HYDROCHLORIDE 20 MG/1
40 CAPSULE ORAL DAILY
Qty: 180 CAP | Refills: 1 | Status: SHIPPED | OUTPATIENT
Start: 2018-03-08

## 2018-08-17 ENCOUNTER — PATIENT OUTREACH (OUTPATIENT)
Dept: HEALTH INFORMATION MANAGEMENT | Facility: OTHER | Age: 78
End: 2018-08-17

## 2018-08-17 NOTE — PROGRESS NOTES
Spoke with pt regarding her , she stated they have moved to [place]. She wanted her chart updated as well as her 's

## 2021-01-11 DIAGNOSIS — Z23 NEED FOR VACCINATION: ICD-10-CM

## 2023-10-13 ENCOUNTER — DOCUMENTATION (OUTPATIENT)
Dept: HEALTH INFORMATION MANAGEMENT | Facility: OTHER | Age: 83
End: 2023-10-13
Payer: MEDICARE